# Patient Record
Sex: FEMALE | Race: WHITE | NOT HISPANIC OR LATINO | URBAN - METROPOLITAN AREA
[De-identification: names, ages, dates, MRNs, and addresses within clinical notes are randomized per-mention and may not be internally consistent; named-entity substitution may affect disease eponyms.]

---

## 2019-06-27 ENCOUNTER — TRANSCRIBE ORDERS (OUTPATIENT)
Dept: PHYSICAL THERAPY | Facility: HOSPITAL | Age: 49
End: 2019-06-27

## 2019-06-27 DIAGNOSIS — R26.81 GAIT INSTABILITY: Primary | ICD-10-CM

## 2019-07-02 ENCOUNTER — APPOINTMENT (OUTPATIENT)
Dept: PHYSICAL THERAPY | Facility: HOSPITAL | Age: 49
End: 2019-07-02

## 2019-07-11 ENCOUNTER — HOSPITAL ENCOUNTER (OUTPATIENT)
Dept: PHYSICAL THERAPY | Facility: HOSPITAL | Age: 49
Setting detail: THERAPIES SERIES
Discharge: HOME | End: 2019-07-11

## 2019-07-11 DIAGNOSIS — R26.81 GAIT INSTABILITY: ICD-10-CM

## 2019-07-11 DIAGNOSIS — R26.89 BALANCE PROBLEM: Primary | ICD-10-CM

## 2019-07-11 PROCEDURE — 97162 PT EVAL MOD COMPLEX 30 MIN: CPT

## 2019-07-11 PROCEDURE — 97110 THERAPEUTIC EXERCISES: CPT

## 2019-07-11 NOTE — THERAPY EVALUATION
Outpatient Physical Therapy Ortho Initial Evaluation  [facility]     Patient Name: Akua Alvarado  : 1970  MRN: [id number]  Today's Date: 2019      Visit Date: 2019    There is no problem list on file for this patient.       History reviewed. No pertinent past medical history.     History reviewed. No pertinent surgical history.    Visit Dx:     ICD-10-CM ICD-9-CM   1. Balance problem R26.89 781.99   2. Gait instability R26.81 781.2         Patient History     Row Name 19 1500             History    Chief Complaint  Balance Problems  -LB      Date Current Problem(s) Began  18  -LB      Brief Description of Current Complaint  Pt reports worsening gait deficits over last year. Long hx of falls in multiple areas, unstable DM and unstable BP resulting in multiple episodes of blacking out and falling as recent as 2019. Pt reports currently medications are controlling BP and DM so syncopal episodes are decreasing. Fall 6 months ago resulted in L ankle fx. No other recent injuries reported. RLE pain from knee to ankle diagnosed as neuropathy. Pt lives alone and has 8 steps to basement with handrail. She reports 40-50 fall in last 2 months. Pt works at [email or web address] with cart to hold or seated work. Pt uses SC occasionally for long distance ambulation.   -LB      Previous treatment for THIS PROBLEM  Rehabilitation short rehab stent at [facility]  -LB      Patient/Caregiver Goals  Return to prior level of function;Know what to do to help the symptoms;Improve mobility  -LB      Patient seeing anyone else for problem(s)?  yes  -LB      History of Previous Related Injuries  L ankle fx 2018  -LB         Pain     Pain Location  Leg  -LB      Pain at Present  2  -LB      Pain at Best  0  -LB      Pain at Worst  2  -LB      Pain Frequency  Intermittent  -LB      Pain Description  Aching  -LB      What Performance Factors Make the Current Problem(s) WORSE?  uneven surfaces  -LB       What Performance Factors Make the Current Problem(s) BETTER?  sitting  -LB      Is your sleep disturbed?  No  -LB         Fall Risk Assessment    Any falls in the past year:  Yes  -LB      Number of falls reported in the last 12 months  >50  -LB      Factors that contributed to the fall:  Tripped;Lost balance  -LB      Does patient have a fear of falling  Yes (comment)  -LB         Services    Prior Rehab/Home Health Experiences  Yes  -LB      When was the prior experience with Rehab/Home Health  unsure  -LB      Where was the prior experience with Rehab/Home Health  [facility]  -LB      Are you currently receiving Home Health services  No  -LB      Do you plan to receive Home Health services in the near future  No  -LB         Daily Activities    Primary Language  English  -LB      Pt Participated in POC and Goals  Yes  -LB         Safety    Are you being hurt, hit, or frightened by anyone at home or in your life?  No  -LB      Are you being neglected by a caregiver  No  -LB        User Key  (r) = Recorded By, (t) = Taken By, (c) = Cosigned By    Initials Name Provider Type    Benita Mcintosh, PT Physical Therapist          PT Ortho     Row Name 07/11/19 1600       Subjective Comments    Subjective Comments  I fall alot. I am just unstable all of the time.  -LB       Precautions and Contraindications    Precautions/Limitations  fall precautions  -LB       Subjective Pain    Able to rate subjective pain?  yes  -LB    Pre-Treatment Pain Level  2  -LB       Posture/Observations    Posture/Observations Comments  wide GEE, B hip ER, lateral sway with static stand  -LB       Myotomal Screen- Lower Quarter Clearing    Hip flexion (L2)  Bilateral:;4- (Good -)  -LB    Knee extension (L3)  Bilateral:;5 (Normal)  -LB    Ankle DF (L4)  Bilateral:;4- (Good -)  -LB    Ankle PF (S1)  Bilateral:;3 (Fair)  -LB    Knee flexion (S2)  Bilateral:;4 (Good)  -LB       General ROM    GENERAL ROM COMMENTS  BLE WFL  -LB       Sensation     Light Touch  Partial deficits in the RLE  -LB       Balance Skills Training    Sitting-Level of Assistance  Independent  -LB    Sitting-Balance Support  Feet supported  -LB    Standing-Level of Assistance  Independent  -LB    Static Standing Balance Support  No upper extremity supported  -LB    Standing-Balance Activities  Feet together;Forward lean;Single Limb Stance;Tandem Stance;Semi-tandum  -LB    Gait Balance-Level of Assistance  Close supervision;Contact guard  -LB    Gait Balance Support  No upper extremity supported  -LB    Gait Balance Activities  scanning environment R/L;side-stepping;backwards;tandem  -LB    SLS  unable to perform without BUE assist  -LB    Ankle Strategy Assessment (Balance)  B ankle weakness  -LB    Hip Strategy Assessment (Balance)  B hip abductor weakness  -LB    Stepping Strategy Assessment (Balance)  decreased reaction time  -LB       Gait/Stairs Assessment/Training    Chelsea Level (Gait)  independent  -LB    Deviations/Abnormal Patterns (Gait)  bilateral deviations;base of support, wide  -LB    Bilateral Gait Deviations  lateral trunk flexion;Trendelenburg sign;leans left  -LB    Comment (Gait/Stairs)  posterior weight shift  -LB      User Key  (r) = Recorded By, (t) = Taken By, (c) = Cosigned By    Initials Name Provider Type    Benita Mcintosh, PT Physical Therapist                      Therapy Education  Education Details: issued HEP, discussed using SC for all community ambulation  Given: HEP, Symptoms/condition management, Fall prevention and home safety, Mobility training  Program: New  How Provided: Verbal, Demonstration  Provided to: Patient  Level of Understanding: Teach back education performed, Verbalized, Demonstrated     PT OP Goals     Row Name 07/11/19 1600          PT Short Term Goals    STG Date to Achieve  07/25/19  -LB     STG 1  Pt will demonstrate understanding and compliance with initial HEP.  -LB     STG 1 Progress  New  -LB     STG 2  Pt will  demonstrate HR x 10 B in standing.  -LB     STG 2 Progress  New  -LB     STG 3  Pt will ambulate with SC for all community ambulation to reduce her risk of falls.  -LB     STG 3 Progress  New  -LB        Long Term Goals    LTG Date to Achieve  08/10/19  -LB     LTG 1  Pt will report 0 falls in last 2 weeks.  -LB     LTG 1 Progress  New  -LB     LTG 2  Pt will demonstrate ankle PF/DF strength improved to 4/5 or better to improve her stability.  -LB     LTG 2 Progress  New  -LB     LTG 3  Pt will demonstrate improved Elliott Balance score from 39 to 44 or better to reduce her to low fall risk.  -LB     LTG 3 Progress  New  -LB        Time Calculation    PT Goal Re-Cert Due Date  10/09/19  -LB       User Key  (r) = Recorded By, (t) = Taken By, (c) = Cosigned By    Initials Name Provider Type    Benita Mcintosh, PT Physical Therapist          PT Assessment/Plan     Row Name 07/11/19 6886          PT Assessment    Functional Limitations  Decreased safety during functional activities;Impaired gait;Limitations in community activities;Performance in leisure activities;Performance in work activities;Limitations in functional capacity and performance;Impaired locomotion  -LB     Impairments  Poor body mechanics;Motor function;Gait;Muscle strength;Balance  -LB     Assessment Comments  Pt is 48 y.o. female referred to outpatient physical therapy for evaluation and treatment of  evolving  bilateral strength deficits resulting in gait impairments, balance problems, and frequent falls.  Patient presents with severe ankle weakness, B hip weakness, gait abnormalities, dec static and dynamic balance, and difficulty with dual tasking. PMHx consistent with uncontrolled DM, BP fluctuations, hx of frequent syncopal episodes triggered by cold/hot primarily, L ankle fx due to fall in April. Personal factors affecting her care include syncopal episodes (pt reports no recent episodes). Pt demonstrates signs and symptoms consistent with  "referring diagnosis. Pt scored 39/56 on the HORN and 41% disability on LEFS. Pt is limited in her ability to participate in community events, community ambulation, household tasks. She will benefit from continued skilled PT services to address functional deficits. Thank you for this referral.  -LB     Please refer to paper survey for additional self-reported information  Yes  -LB     Rehab Potential  Good  -LB     Patient/caregiver participated in establishment of treatment plan and goals  Yes  -LB     Patient would benefit from skilled therapy intervention  Yes  -LB        PT Plan    PT Frequency  2x/week  -LB     Predicted Duration of Therapy Intervention (Therapy Eval)  4 weeks   -LB     Planned CPT's?  PT EVAL MOD COMPLELITY: 78287;PT THER PROC EA 15 MIN: 20197;PT MANUAL THERAPY EA 15 MIN: 76580;PT RE-EVAL: 45276;PT THER ACT EA 15 MIN: 68619;PT NEUROMUSC RE-EDUCATION EA 15 MIN: 33698;PT GAIT TRAINING EA 15 MIN: 45867;PT HOT OR COLD PACK TREAT MCARE;PT HOT/COLD PACK WC NONMCARE: 41214  -LB     PT Plan Comments  Assess tolerance to HEP, warm up on Nustep, consider // bars gait activities, forward, retro, lateral, tandem, step taps on 6\", blue foam EO/EC. core/hip strengthening on mat table.  -LB       User Key  (r) = Recorded By, (t) = Taken By, (c) = Cosigned By    Initials Name Provider Type    Benita Mcintosh, PT Physical Therapist            Exercises     Row Name 07/11/19 1600             Subjective Comments    Subjective Comments  I fall alot. I am just unstable all of the time.  -LB         Subjective Pain    Able to rate subjective pain?  yes  -LB      Pre-Treatment Pain Level  2  -LB         Total Minutes    80797 - PT Therapeutic Exercise Minutes  15  -LB         Exercise 1    Exercise Name 1  seated heel/toe raises  -LB      Reps 1  20  -LB         Exercise 2    Exercise Name 2  SLS at counter  -LB      Reps 2  3  -LB      Time 2  15  -LB         Exercise 3    Exercise Name 3  hip adduction isometric  " -LB      Reps 3  10  -LB      Time 3  5  -LB         Exercise 4    Exercise Name 4  hip abduction HL  -LB      Reps 4  10  -LB      Additional Comments  GTB  -LB        User Key  (r) = Recorded By, (t) = Taken By, (c) = Cosigned By    Initials Name Provider Type    Benita Mcintosh PT Physical Therapist                        Outcome Measure Options: Elliott Balance, Lower Extremity Functional Scale (LEFS)  Elliott Balance Scale  Sitting to Standing: able to stand using hands after several tries  Standing Unsupported: able to stand 2 minutes with supervision  Sitting with Back Unsupported but Feet Supported on Floor or on Stool: able to sit safely and securely for 2 minutes  Standing to Sitting: controls descent by using hands  Transfers: able to transfer safely definite need of hands  Standing Unsupported with Eyes Closed: able to stand 10 seconds with supervision  Standing Unsupported with Feet Together: able to place feet together independently and stand 1 minute with supervision  Reaching Forward with Outstretched Arm While Standing: can reach forward confidently 25 cm (10 inches)   Object From the Floor From a Standing Position: able to  object safely and easily  Turning to Look Behind Over Left and Right Shoulders While Standing: turns sideways only but maintains balance  Turn 360 Degrees: able to turn 360 degrees safely in 4 seconds or less  Place Alternate Foot on Step or Stool While Standing Unsupported: able to complete 4 steps without aid with supervision  Standing Unsupported with One Foot in Front: needs help to step but can hold 15 seconds  Standing on One Leg: tries to lift leg unable to hold 3 seconds but remains standing independently  Elliott Total Score: 39  Elliott Comments: medium fall risk  Lower Extremity Functional Index  Any of your usual work, housework or school activities: No difficulty(41% disability)  Your usual hobbies, recreational or sporting activities: No difficulty  Getting  into or out of the bath: Extreme difficulty or unable to perform activity  Walking between rooms: No difficulty  Putting on your shoes or socks: No difficulty  Squatting: Extreme difficulty or unable to perform activity  Lifting an object, like a bag of groceries from the floor: No difficulty  Performing light activities around your home: No difficulty  Performing heavy activities around your home: A little bit of difficulty  Getting into or out of a car: No difficulty  Walking 2 blocks: A little bit of difficulty  Walking a mile: Quite a bit of difficulty  Going up or down 10 stairs (about 1 flight of stairs): Moderate difficulty  Standing for 1 hour: Moderate difficulty  Sitting for 1 hour: No difficulty  Running on even ground: Extreme difficulty or unable to perform activity  Running on uneven ground: Extreme difficulty or unable to perform activity  Making sharp turns while running fast: Extreme difficulty or unable to perform activity  Hopping: Extreme difficulty or unable to perform activity  Rolling over in bed: No difficulty  Total: 47      Time Calculation:     Start Time: 1515  Stop Time: 1600  Time Calculation (min): 45 min  Total Timed Code Minutes- PT: 15 minute(s)     Therapy Charges for Today     Code Description Service Date Service Provider Modifiers Qty    95636702855 HC PT THER PROC EA 15 MIN 7/11/2019 Benita Oconnor, PT GP 1    57333700930 HC PT EVAL MOD COMPLEXITY 2 7/11/2019 Benita Oconnor, PT GP 1          PT G-Codes  Outcome Measure Options: Elliott Balance, Lower Extremity Functional Scale (LEFS)  Elliott Total Score: 39  Total: 47         Benita Oconnor PT  7/11/2019

## 2019-07-22 ENCOUNTER — HOSPITAL ENCOUNTER (OUTPATIENT)
Dept: PHYSICAL THERAPY | Facility: HOSPITAL | Age: 49
Setting detail: THERAPIES SERIES
Discharge: HOME | End: 2019-07-22

## 2019-07-22 DIAGNOSIS — R26.81 GAIT INSTABILITY: ICD-10-CM

## 2019-07-22 DIAGNOSIS — R26.89 BALANCE PROBLEM: Primary | ICD-10-CM

## 2019-07-22 PROCEDURE — 97110 THERAPEUTIC EXERCISES: CPT

## 2019-07-22 PROCEDURE — 97112 NEUROMUSCULAR REEDUCATION: CPT

## 2019-07-22 NOTE — THERAPY TREATMENT NOTE
Outpatient Physical Therapy Ortho Treatment Note  [facility]     Patient Name: Akua Alvarado  : 1970  MRN: [id number]  Today's Date: 2019      Visit Date: 2019    Visit Dx:    ICD-10-CM ICD-9-CM   1. Balance problem R26.89 781.99   2. Gait instability R26.81 781.2       There is no problem list on file for this patient.       No past medical history on file.     No past surgical history on file.                    PT Assessment/Plan     Row Name 19 1000          PT Assessment    Assessment Comments  Ms. Alvarado returns today for first f/u since initial eval, reporting good compliance with HEP, although some difficulty with SLS. Continued today with progression of global strengthening, with focus on core/hips, and progression of balance/gait activities in // bars.  -CA        PT Plan    PT Plan Comments  Cont to progress per pt tolerance, consider AR press, leg press per pt tolerance.  -CA       User Key  (r) = Recorded By, (t) = Taken By, (c) = Cosigned By    Initials Name Provider Type    Angie Castellanos, PT Physical Therapist            Exercises     Row Name 19 1000             Subjective Comments    Subjective Comments  I did all of my exercises, but I had a hard time with the SLS one.  -CA         Subjective Pain    Able to rate subjective pain?  yes  -CA      Pre-Treatment Pain Level  4  -CA         Total Minutes    26971 - PT Therapeutic Exercise Minutes  25  -CA      81427 -  PT Neuromuscular Reeducation Minutes  15  -CA         Exercise 1    Exercise Name 1  Nu step  -CA      Time 1  5 min  -CA         Exercise 2    Exercise Name 2  SLS  -CA      Reps 2  3  -CA      Time 2  15  -CA      Additional Comments  // bars, UE support, brief moments without UE support  -CA         Exercise 3    Exercise Name 3  hip adduction isometric  -CA      Sets 3  2  -CA      Reps 3  10  -CA      Time 3  5  -CA         Exercise 4    Exercise Name 4  hip abduction HL  -CA      Sets 4  2   "-CA      Reps 4  10  -CA      Time 4  GTB  -CA         Exercise 5    Exercise Name 5  PPT  -CA      Cueing 5  Verbal  -CA      Reps 5  10  -CA      Time 5  5s  -CA         Exercise 6    Exercise Name 6  bridge w/ PPT  -CA      Cueing 6  Verbal  -CA      Reps 6  10  -CA      Additional Comments  small/pain free range  -CA         Exercise 7    Exercise Name 7  B LAQ 2#  -CA      Cueing 7  Verbal  -CA      Sets 7  2  -CA      Reps 7  10  -CA      Time 7  3s  -CA         Exercise 8    Exercise Name 8  seated B HS curl  -CA      Cueing 8  Verbal  -CA      Sets 8  2  -CA      Reps 8  10  -CA      Time 8  RTB  -CA         Exercise 9    Exercise Name 9  fwd/retro walk in // bars  -CA      Cueing 9  Verbal  -CA      Reps 9  2 laps  -CA      Additional Comments  cues to avoid looking down at feet  -CA         Exercise 10    Exercise Name 10  tandem walk in // bars  -CA      Cueing 10  Verbal  -CA      Reps 10  3 laps  -CA      Additional Comments  UE support  -CA         Exercise 11    Exercise Name 11  side stepping in // bars  -CA      Cueing 11  Verbal  -CA      Sets 11  3 laps  -CA      Additional Comments  2 finger support ea hand  -CA         Exercise 12    Exercise Name 12  step taps 6\"  -CA      Cueing 12  Verbal  -CA      Reps 12  x20 B  -CA      Additional Comments  one UE support  -CA         Exercise 13    Exercise Name 13  B standing hip abd and ext  -CA      Cueing 13  Verbal  -CA      Sets 13  2 ea.  -CA      Reps 13  10  -CA         Exercise 14    Exercise Name 14  STS  -CA      Cueing 14  Verbal  -CA      Reps 14  5  -CA      Additional Comments  chair with use of UE  -CA         Exercise 15    Exercise Name 15  B HR  -CA      Cueing 15  Verbal  -CA      Sets 15  2  -CA      Reps 15  8  -CA      Additional Comments  at // bars  -CA        User Key  (r) = Recorded By, (t) = Taken By, (c) = Cosigned By    Initials Name Provider Type    Angie Castellanos PT Physical Therapist                                 "          Time Calculation:   Start Time: 1000  Stop Time: 1040  Time Calculation (min): 40 min  Total Timed Code Minutes- PT: 40 minute(s)  Therapy Charges for Today     Code Description Service Date Service Provider Modifiers Qty    35863838704  PT NEUROMUSC RE EDUCATION EA 15 MIN 7/22/2019 Angie Jasso, PT GP 1    [id number]  PT THER PROC EA 15 MIN 7/22/2019 Angie Jasso, PT GP 2                    Angie Jasso, PT  7/22/2019

## 2019-07-25 ENCOUNTER — APPOINTMENT (OUTPATIENT)
Dept: PHYSICAL THERAPY | Facility: HOSPITAL | Age: 49
End: 2019-07-25

## 2019-07-29 ENCOUNTER — HOSPITAL ENCOUNTER (OUTPATIENT)
Dept: PHYSICAL THERAPY | Facility: HOSPITAL | Age: 49
Setting detail: THERAPIES SERIES
Discharge: HOME | End: 2019-07-29

## 2019-07-29 DIAGNOSIS — R26.89 BALANCE PROBLEM: Primary | ICD-10-CM

## 2019-07-29 DIAGNOSIS — R26.81 GAIT INSTABILITY: ICD-10-CM

## 2019-07-29 PROCEDURE — 97112 NEUROMUSCULAR REEDUCATION: CPT

## 2019-07-29 PROCEDURE — 97110 THERAPEUTIC EXERCISES: CPT

## 2019-07-29 NOTE — THERAPY TREATMENT NOTE
Outpatient Physical Therapy Ortho Treatment Note  [facility]     Patient Name: Akua Alvarado  : 1970  MRN: [id number]  Today's Date: 2019      Visit Date: 2019    Visit Dx:    ICD-10-CM ICD-9-CM   1. Balance problem R26.89 781.99   2. Gait instability R26.81 781.2       There is no problem list on file for this patient.       No past medical history on file.     No past surgical history on file.                    PT Assessment/Plan     Row Name 19 1300          PT Assessment    Assessment Comments  Ms. Alvarado returns today ambulating with out SPC, reports she has been going without cane all day. Continued to progress global strengthening and balance activities this date with no adverse reactions.  -CA        PT Plan    PT Plan Comments  Consider addition of AR press  -CA       User Key  (r) = Recorded By, (t) = Taken By, (c) = Cosigned By    Initials Name Provider Type    Angie Castellanos, PT Physical Therapist            Exercises     Row Name 19 1300             Subjective Comments    Subjective Comments  I think my legs are getting stronger   -CA         Subjective Pain    Able to rate subjective pain?  yes  -CA      Pre-Treatment Pain Level  5  -CA         Total Minutes    20318 - PT Therapeutic Exercise Minutes  12  -CA      09759 -  PT Neuromuscular Reeducation Minutes  23  -CA         Exercise 1    Exercise Name 1  Nu step  -CA      Time 1  5 min  -CA         Exercise 2    Exercise Name 2  grapevinee  -CA      Cueing 2  Verbal  -CA      Reps 2  3 laps // Bars  -CA      Additional Comments  UE support  -CA         Exercise 3    Exercise Name 3  hip adduction isometric  -CA      Sets 3  2  -CA      Reps 3  10  -CA      Time 3  5  -CA         Exercise 4    Exercise Name 4  hip abduction HL  -CA      Sets 4  2  -CA      Reps 4  10  -CA      Time 4  GTB  -CA         Exercise 5    Exercise Name 5  lateral step up  -CA      Cueing 5  Verbal  -CA      Sets 5  2 B  -CA       "Reps 5  10  -CA      Time 5  4\" step  -CA      Additional Comments  UE support  -CA         Exercise 6    Exercise Name 6  bridge w/ PPT  -CA      Cueing 6  Verbal  -CA      Sets 6  2  -CA      Reps 6  10  -CA         Exercise 7    Exercise Name 7  B LAQ 3#  -CA      Cueing 7  Verbal  -CA      Sets 7  2  -CA      Reps 7  10  -CA      Time 7  3s  -CA         Exercise 8    Exercise Name 8  seated B HS curl  -CA      Cueing 8  Verbal  -CA      Sets 8  2  -CA      Reps 8  10  -CA      Time 8  RTB  -CA         Exercise 9    Exercise Name 9  Row w/ SLS and opposite toe down  -CA      Cueing 9  Verbal  -CA      Reps 9  15 B  -CA      Time 9  RTB  -CA         Exercise 10    Exercise Name 10  tandem walk in // bars  -CA      Cueing 10  Verbal  -CA      Reps 10  3 laps  -CA      Additional Comments  UE support  -CA         Exercise 11    Exercise Name 11  side stepping in // bars  -CA      Cueing 11  Verbal  -CA      Sets 11  3 laps  -CA      Time 11  RTB  -CA         Exercise 12    Exercise Name 12  step taps 6\"  -CA      Cueing 12  Verbal  -CA      Reps 12  x20 B  -CA      Additional Comments  B 2 finger support  -CA         Exercise 13    Exercise Name 13  B standing hip abd and ext  -CA      Cueing 13  Verbal  -CA      Sets 13  2 ea.  -CA      Reps 13  10  -CA         Exercise 14    Exercise Name 14  STS  -CA      Cueing 14  Verbal  -CA      Sets 14  2  -CA      Reps 14  10  -CA      Additional Comments  from elevated mat  -CA         Exercise 15    Exercise Name 15  B HR and toe raise  -CA      Cueing 15  Verbal  -CA      Sets 15  2  -CA      Reps 15  10  -CA      Additional Comments  // bars  -CA         Exercise 16    Exercise Name 16  Narrow GEE blue fo  -CA      Sets 16  2  -CA      Time 16  20s  -CA         Exercise 17    Exercise Name 17  eyes closed blue foam  -CA      Sets 17  3  -CA      Time 17  10s  -CA         Exercise 18    Exercise Name 18  marching in // bars  -CA      Reps 18  3 laps  -CA      Additional " Comments   no UE  -CA        User Key  (r) = Recorded By, (t) = Taken By, (c) = Cosigned By    Initials Name Provider Type    Angie Castellanos, PT Physical Therapist                                          Time Calculation:   Start Time: 1315  Stop Time: 1355  Time Calculation (min): 40 min  Total Timed Code Minutes- PT: 40 minute(s)  Therapy Charges for Today     Code Description Service Date Service Provider Modifiers Qty    [id number]  PT NEUROMUSC RE EDUCATION EA 15 MIN 7/29/2019 Angie Jasso, PT GP 2    [id number]  PT THER PROC EA 15 MIN 7/29/2019 Angie Jasso, PT GP 1                    Angie Jasso, PT  7/29/2019

## 2019-08-01 ENCOUNTER — HOSPITAL ENCOUNTER (OUTPATIENT)
Dept: PHYSICAL THERAPY | Facility: HOSPITAL | Age: 49
Setting detail: THERAPIES SERIES
Discharge: HOME | End: 2019-08-01

## 2019-08-01 DIAGNOSIS — R26.89 BALANCE PROBLEM: Primary | ICD-10-CM

## 2019-08-01 DIAGNOSIS — R26.81 GAIT INSTABILITY: ICD-10-CM

## 2019-08-01 PROCEDURE — 97112 NEUROMUSCULAR REEDUCATION: CPT

## 2019-08-01 PROCEDURE — 97110 THERAPEUTIC EXERCISES: CPT

## 2019-08-01 NOTE — THERAPY TREATMENT NOTE
Outpatient Physical Therapy Ortho Treatment Note  [facility]     Patient Name: Akua Alvarado  : 1970  MRN: [id number]  Today's Date: 2019      Visit Date: 2019    Visit Dx:    ICD-10-CM ICD-9-CM   1. Balance problem R26.89 781.99   2. Gait instability R26.81 781.2       There is no problem list on file for this patient.       No past medical history on file.     No past surgical history on file.                    PT Assessment/Plan     Row Name 19 1100          PT Assessment    Assessment Comments  Continues to amb without SPC, reports only occassional use for long distances and stair navigation at home as she does not have hand rails. Continues to demo balance deficits during tandem walking and standing on unstable surfaces.   -CA        PT Plan    PT Plan Comments  Consider addition of ball toss balance activities.  -CA       User Key  (r) = Recorded By, (t) = Taken By, (c) = Cosigned By    Initials Name Provider Type    Angie Castellanos, PT Physical Therapist            Exercises     Row Name 19 1100             Subjective Comments    Subjective Comments  My R knee feels stiff today  -CA         Subjective Pain    Able to rate subjective pain?  yes  -CA      Pre-Treatment Pain Level  7  -CA      Post-Treatment Pain Level  0  -CA         Total Minutes    07431 - PT Therapeutic Exercise Minutes  25  -CA      83956 -  PT Neuromuscular Reeducation Minutes  15  -CA         Exercise 1    Exercise Name 1  Nu step  -CA      Time 1  5 min  -CA         Exercise 2    Exercise Name 2  grapevinee  -CA      Cueing 2  Verbal  -CA      Reps 2  3 laps // Bars  -CA      Additional Comments  UE support  -CA         Exercise 3    Exercise Name 3  hip adduction isometric  -CA      Sets 3  2  -CA      Reps 3  10  -CA      Time 3  5  -CA         Exercise 4    Exercise Name 4  hip abduction HL  -CA      Sets 4  3  -CA      Reps 4  10  -CA      Time 4  GTB  -CA         Exercise 5    Exercise Name  "5  lateral step up  -CA      Cueing 5  Verbal  -CA      Sets 5  2 B  -CA      Reps 5  10  -CA      Time 5  4\" step + blue foam  -CA      Additional Comments  UE support  -CA         Exercise 6    Exercise Name 6  bridge w/ PPT  -CA      Cueing 6  Verbal  -CA      Sets 6  2  -CA      Reps 6  10  -CA         Exercise 7    Exercise Name 7  B LAQ 4#  -CA      Cueing 7  Verbal  -CA      Sets 7  2  -CA      Reps 7  10  -CA      Time 7  3s  -CA         Exercise 8    Exercise Name 8  seated B HS curl  -CA      Cueing 8  Verbal  -CA      Sets 8  2  -CA      Reps 8  10  -CA      Time 8  GTB  -CA         Exercise 9    Exercise Name 9  Row w/ SLS and opposite toe down  -CA      Cueing 9  Verbal  -CA      Reps 9  15 B  -CA      Time 9  RTB  -CA         Exercise 10    Exercise Name 10  tandem walk in // bars  -CA      Cueing 10  Verbal  -CA      Reps 10  3 laps  -CA      Additional Comments  occassional UE support  -CA         Exercise 11    Exercise Name 11  side stepping in // bars  -CA      Cueing 11  Verbal  -CA      Sets 11  3 laps  -CA      Time 11  RTB  -CA         Exercise 12    Exercise Name 12  B SL clams  -CA      Cueing 12  Verbal  -CA      Reps 12  2x10  -CA      Time 12  RTB  -CA         Exercise 15    Exercise Name 15  mini squat at // bar  -CA      Cueing 15  Verbal  -CA      Sets 15  2  -CA      Reps 15  10  -CA         Exercise 16    Exercise Name 16  tandem stance blue foam  -CA      Sets 16  3 B  -CA      Time 16  15 s  -CA         Exercise 17    Exercise Name 17  eyes closed blue foam  -CA      Sets 17  3  -CA      Time 17  10s  -CA      Additional Comments  narrow GEE  -CA        User Key  (r) = Recorded By, (t) = Taken By, (c) = Cosigned By    Initials Name Provider Type    Angie Castellanos PT Physical Therapist                                          Time Calculation:   Start Time: 1130  Stop Time: 1210  Time Calculation (min): 40 min  Total Timed Code Minutes- PT: 40 minute(s)  Therapy Charges for " Today     Code Description Service Date Service Provider Modifiers Qty    43877600908 HC PT NEUROMUSC RE EDUCATION EA 15 MIN 8/1/2019 Angie Jasso, PT GP 1    17608366378 HC PT THER PROC EA 15 MIN 8/1/2019 Angie Jasso, PT GP 2                    Angie Jasso, PT  8/1/2019

## 2019-08-05 ENCOUNTER — HOSPITAL ENCOUNTER (OUTPATIENT)
Dept: PHYSICAL THERAPY | Facility: HOSPITAL | Age: 49
Setting detail: THERAPIES SERIES
Discharge: HOME | End: 2019-08-05

## 2019-08-05 DIAGNOSIS — R26.89 BALANCE PROBLEM: Primary | ICD-10-CM

## 2019-08-05 DIAGNOSIS — R26.81 GAIT INSTABILITY: ICD-10-CM

## 2019-08-05 PROCEDURE — 97110 THERAPEUTIC EXERCISES: CPT

## 2019-08-05 PROCEDURE — 97112 NEUROMUSCULAR REEDUCATION: CPT

## 2019-08-05 NOTE — THERAPY TREATMENT NOTE
Outpatient Physical Therapy Ortho Treatment Note  [facility]     Patient Name: Akua Alvarado  : 1970  MRN: [id number]  Today's Date: 2019      Visit Date: 2019    Visit Dx:    ICD-10-CM ICD-9-CM   1. Balance problem R26.89 781.99   2. Gait instability R26.81 781.2       There is no problem list on file for this patient.       No past medical history on file.     No past surgical history on file.                    PT Assessment/Plan     Row Name 19 1000          PT Assessment    Assessment Comments  Continued with progression of balance activities today, including addition of ball toss activity. Pt requires CGA throughout ball toss, with CGa to min A once on blue foam. Will cont to progress B LE strength and balance  -CA        PT Plan    PT Plan Comments  Consider addition of walk with ball rotation  -CA       User Key  (r) = Recorded By, (t) = Taken By, (c) = Cosigned By    Initials Name Provider Type    Angie Castellanos, PT Physical Therapist            Exercises     Row Name 19 1000             Subjective Comments    Subjective Comments  I'm feeling a lot better, no knee pain today  -CA         Subjective Pain    Able to rate subjective pain?  yes  -CA      Pre-Treatment Pain Level  0  -CA         Total Minutes    74508 - PT Therapeutic Exercise Minutes  15  -CA      19565 -  PT Neuromuscular Reeducation Minutes  25  -CA         Exercise 1    Exercise Name 1  Nu step  -CA      Time 1  5 min  -CA         Exercise 2    Exercise Name 2  grapevinee  -CA      Cueing 2  Verbal  -CA      Reps 2  3 laps // Bars  -CA      Additional Comments  UE support  -CA         Exercise 3    Exercise Name 3  AR press  -CA      Reps 3  15 B  -CA      Time 3  YTB  -CA         Exercise 4    Exercise Name 4  B SL clam  -CA      Sets 4  2  -CA      Reps 4  10  -CA      Time 4  YTB  -CA         Exercise 5    Exercise Name 5  lateral step up  -CA      Cueing 5  Verbal  -CA      Sets 5  2 B  -CA       "Reps 5  10  -CA      Time 5  4\" step + blue foam  -CA      Additional Comments  UE support  -CA         Exercise 7    Exercise Name 7  B LAQ 5#  -CA      Cueing 7  Verbal  -CA      Sets 7  2  -CA      Reps 7  10  -CA      Time 7  3s  -CA         Exercise 8    Exercise Name 8  B HS curl standing  -CA      Cueing 8  Verbal  -CA      Sets 8  2  -CA      Reps 8  10  -CA         Exercise 9    Exercise Name 9  Row w/ SLS and opposite toe down  -CA      Cueing 9  Verbal  -CA      Reps 9  15 B  -CA      Time 9  RTB  -CA         Exercise 10    Exercise Name 10  tandem walk in // bars  -CA      Cueing 10  Verbal  -CA      Reps 10  3 laps  -CA      Additional Comments  occassional UE support  -CA         Exercise 11    Exercise Name 11  side stepping in // bars  -CA      Cueing 11  Verbal  -CA      Sets 11  3 laps  -CA      Time 11  RTB  -CA         Exercise 12    Exercise Name 12  split stance on yellow foam with #2 ball wood choppers  -CA      Cueing 12  Verbal  -CA      Reps 12  15B  -CA         Exercise 13    Exercise Name 13  ball toss activities in // bars with CGA and occassional min A from therapist  -CA      Time 13  8 min  -CA      Additional Comments  toss at catch with narrow GEE; B semi tandem; B one foot on blue foam, one on ground; and both feet on blue foam; dual tasking for 1 foot on 1 foot of includes telling pt whether to bounce or throw ball after catch.  -CA         Exercise 15    Exercise Name 15  mini squat at // bar  -CA      Cueing 15  Verbal  -CA      Sets 15  2  -CA      Reps 15  10  -CA        User Key  (r) = Recorded By, (t) = Taken By, (c) = Cosigned By    Initials Name Provider Type    Angie Castellanos PT Physical Therapist                                          Time Calculation:   Start Time: 1000  Stop Time: 1040  Time Calculation (min): 40 min  Total Timed Code Minutes- PT: 40 minute(s)  Therapy Charges for Today     Code Description Service Date Service Provider Modifiers Qty    " [id number]  PT NEUROMUSC RE EDUCATION EA 15 MIN 8/5/2019 Angie Jasso, PT GP 2    [id number]  PT THER PROC EA 15 MIN 8/5/2019 Angie Jasso, PT GP 1                    Angie Jasso, PT  8/5/2019

## 2019-08-07 ENCOUNTER — HOSPITAL ENCOUNTER (OUTPATIENT)
Dept: PHYSICAL THERAPY | Facility: HOSPITAL | Age: 49
Setting detail: THERAPIES SERIES
Discharge: HOME | End: 2019-08-07

## 2019-08-07 DIAGNOSIS — R26.81 GAIT INSTABILITY: ICD-10-CM

## 2019-08-07 DIAGNOSIS — R26.89 BALANCE PROBLEM: Primary | ICD-10-CM

## 2019-08-07 PROCEDURE — 97110 THERAPEUTIC EXERCISES: CPT

## 2019-08-07 PROCEDURE — 97112 NEUROMUSCULAR REEDUCATION: CPT

## 2019-08-07 NOTE — THERAPY TREATMENT NOTE
Outpatient Physical Therapy Ortho Treatment Note  [facility]     Patient Name: Akua Alvarado  : 1970  MRN: [id number]  Today's Date: 2019      Visit Date: 2019    Visit Dx:    ICD-10-CM ICD-9-CM   1. Balance problem R26.89 781.99   2. Gait instability R26.81 781.2       There is no problem list on file for this patient.       No past medical history on file.     No past surgical history on file.                    PT Assessment/Plan     Row Name 19 1500          PT Assessment    Assessment Comments  Pt cont to show gradual improvement in balance, but overall still limited and at risk of falls. Continued with progression of dynamic balance activities including on compliant surfaces.  -CA        PT Plan    PT Plan Comments  Cont to progress balance and gait training  -CA       User Key  (r) = Recorded By, (t) = Taken By, (c) = Cosigned By    Initials Name Provider Type    Angie Castellanos, PT Physical Therapist            Exercises     Row Name 19 1400             Subjective Comments    Subjective Comments  My walking is getting better  -CA         Subjective Pain    Able to rate subjective pain?  yes  -CA      Pre-Treatment Pain Level  0  -CA         Total Minutes    14356 - PT Therapeutic Exercise Minutes  15  -CA      55142 -  PT Neuromuscular Reeducation Minutes  25  -CA         Exercise 1    Exercise Name 1  Nu step  -CA      Sets 1  lvl 5  -CA      Time 1  5 min  -CA         Exercise 2    Exercise Name 2  walking in gym with head rotation and calling out objects on each side no UE  -CA      Cueing 2  Verbal  -CA      Reps 2  50 ft  -CA         Exercise 3    Exercise Name 3  AR press  -CA      Reps 3  10 B  -CA      Time 3  YTB  -CA      Additional Comments  stagger stance  -CA         Exercise 4    Exercise Name 4  B SL clam  -CA      Sets 4  2  -CA      Reps 4  10  -CA      Time 4  RTB  -CA         Exercise 5    Exercise Name 5  lateral step up with opposite leg march   "-CA      Cueing 5  Verbal  -CA      Sets 5  2 B  -CA      Reps 5  10  -CA      Time 5  4\" step + blue foam  -CA      Additional Comments  UE support  -CA         Exercise 6    Exercise Name 6  fwd step up with march  -CA      Cueing 6  Verbal  -CA      Sets 6  2 B  -CA      Reps 6  10  -CA      Time 6  4\" + blue foam  -CA      Additional Comments  UE support  -CA         Exercise 7    Exercise Name 7  STS from BOSU on mat with stagger stance  -CA      Cueing 7  Verbal  -CA      Sets 7  2 B  -CA      Reps 7  5  -CA         Exercise 9    Exercise Name 9  obstacle course in // bars  -CA      Cueing 9  Verbal  -CA      Reps 9  4 laps  -CA      Additional Comments  step up and over BOSU; tandem walk across blue and yellow foam; step over rudy; backwards walk to end of // bars; 10 mini squat  -CA         Exercise 10    Exercise Name 10  tandem stance on blue foam  -CA      Cueing 10  Verbal  -CA      Sets 10  3 B  -CA      Time 10  15s  -CA         Exercise 11    Exercise Name 11  walking in // bars with trunk and head rot in opposite direction with #1 ball  -CA      Cueing 11  Verbal  -CA      Sets 11  4 laps  -CA        User Key  (r) = Recorded By, (t) = Taken By, (c) = Cosigned By    Initials Name Provider Type    Angie Castellanos, RICKEY Physical Therapist                                          Time Calculation:   Start Time: 1400  Stop Time: 1440  Time Calculation (min): 40 min  Total Timed Code Minutes- PT: 40 minute(s)  Therapy Charges for Today     Code Description Service Date Service Provider Modifiers Qty    60800034860 HC PT NEUROMUSC RE EDUCATION EA 15 MIN 8/7/2019 Angie Jasso, PT GP 2    75949117219 HC PT THER PROC EA 15 MIN 8/7/2019 Angie Jasso, PT GP 1                    Angie Jasso PT  8/7/2019     "

## 2019-08-08 ENCOUNTER — APPOINTMENT (OUTPATIENT)
Dept: PHYSICAL THERAPY | Facility: HOSPITAL | Age: 49
End: 2019-08-08

## 2019-08-12 ENCOUNTER — APPOINTMENT (OUTPATIENT)
Dept: PHYSICAL THERAPY | Facility: HOSPITAL | Age: 49
End: 2019-08-12

## 2019-08-14 ENCOUNTER — HOSPITAL ENCOUNTER (OUTPATIENT)
Dept: PHYSICAL THERAPY | Facility: HOSPITAL | Age: 49
Setting detail: THERAPIES SERIES
Discharge: HOME | End: 2019-08-14

## 2019-08-14 DIAGNOSIS — R26.81 GAIT INSTABILITY: ICD-10-CM

## 2019-08-14 DIAGNOSIS — R26.89 BALANCE PROBLEM: Primary | ICD-10-CM

## 2019-08-14 PROCEDURE — 97110 THERAPEUTIC EXERCISES: CPT

## 2019-08-14 PROCEDURE — 97112 NEUROMUSCULAR REEDUCATION: CPT

## 2019-08-14 NOTE — THERAPY TREATMENT NOTE
"    Outpatient Physical Therapy Ortho Treatment Note  [facility]     Patient Name: Akua Alvarado  : 1970  MRN: [id number]  Today's Date: 2019      Visit Date: 2019    Visit Dx:    ICD-10-CM ICD-9-CM   1. Balance problem R26.89 781.99   2. Gait instability R26.81 781.2       There is no problem list on file for this patient.       No past medical history on file.     No past surgical history on file.                    PT Assessment/Plan     Row Name 19 1400          PT Assessment    Assessment Comments  Pt cont to demo improvement in balance, reports feeling less \"wobbly\" overall. Continued with high level balance and multitasking activities today, with some generalized LE strengthening.  -CA        PT Plan    PT Plan Comments  Cont to progress balance and gait training, consider obstacle course with different height objects for stepping over.  -CA       User Key  (r) = Recorded By, (t) = Taken By, (c) = Cosigned By    Initials Name Provider Type    Angie Castellanos, PT Physical Therapist            Exercises     Row Name 19 1400             Subjective Comments    Subjective Comments  still feel a little wobbly, Monday my ankle rolled over but I didn't fall. I do think my balance has gotten better  -CA         Subjective Pain    Able to rate subjective pain?  yes  -CA      Pre-Treatment Pain Level  0  -CA         Total Minutes    96861 - PT Therapeutic Exercise Minutes  15  -CA      08249 -  PT Neuromuscular Reeducation Minutes  25  -CA         Exercise 1    Exercise Name 1  recumbent bike  -CA      Sets 1  lvl 3  -CA      Time 1  5 min  -CA         Exercise 2    Exercise Name 2  mini squat in // bars  -CA      Cueing 2  Verbal  -CA      Sets 2  2  -CA      Reps 2  10  -CA         Exercise 3    Exercise Name 3  AR press  -CA      Reps 3  10 B  -CA      Time 3  YTB  -CA         Exercise 4    Exercise Name 4  leg press  -CA      Sets 4  2  -CA      Reps 4  10  -CA      Time 4  80#  " "-CA         Exercise 6    Exercise Name 6  fwd step up with march  -CA      Cueing 6  Verbal  -CA      Sets 6  2 B  -CA      Reps 6  10  -CA      Time 6  6\" + blue foam  -CA      Additional Comments  UE support  -CA         Exercise 7    Exercise Name 7  STS from BOSU on mat with stagger stance  -CA      Cueing 7  Verbal  -CA      Sets 7  2 B  -CA      Reps 7  10  -CA         Exercise 8    Exercise Name 8  tband shldr ext  -CA      Cueing 8  Verbal  -CA      Sets 8  2  -CA      Reps 8  10  -CA      Time 8  rtb  -CA      Additional Comments  stagger stance  -CA         Exercise 10    Exercise Name 10  tandem stance on blue foam  -CA      Cueing 10  Verbal  -CA      Sets 10  3 B  -CA      Time 10  15s  -CA         Exercise 11    Exercise Name 11  side stepping in // bars  -CA      Cueing 11  Verbal  -CA      Sets 11  3 laps  -CA      Time 11  RTB  -CA         Exercise 12    Exercise Name 12  step taps 6\"  -CA      Cueing 12  Verbal  -CA      Reps 12  x20 B  -CA      Additional Comments  B 2 finger support  -CA         Exercise 13    Exercise Name 13  ball toss activities  -CA      Reps 13  CGA  -CA      Time 13  10 min  -CA      Additional Comments  (1) facing 90 deg away from ball , rotate at trunk to face ball  and catch ball, rotate 180 deg to tap ball on PT's hand as target, rotate back to ball  and then toss and catch ball; (2) one foot on BOSU one on ground toss and catch  -CA        User Key  (r) = Recorded By, (t) = Taken By, (c) = Cosigned By    Initials Name Provider Type    CA Angie Jasso PT Physical Therapist                                          Time Calculation:   Start Time: 1405  Stop Time: 1445  Time Calculation (min): 40 min  Total Timed Code Minutes- PT: 40 minute(s)  Therapy Charges for Today     Code Description Service Date Service Provider Modifiers Qty    64448025607 HC PT NEUROMUSC RE EDUCATION EA 15 MIN 8/14/2019 Angie Jasso, PT GP 2    26557208054 HC PT THER " PROC EA 15 MIN 8/14/2019 Angie Jasso, PT GP 1                    Angie Jasso, PT  8/14/2019

## 2019-08-15 ENCOUNTER — APPOINTMENT (OUTPATIENT)
Dept: PHYSICAL THERAPY | Facility: HOSPITAL | Age: 49
End: 2019-08-15

## 2019-08-23 ENCOUNTER — HOSPITAL ENCOUNTER (OUTPATIENT)
Dept: PHYSICAL THERAPY | Facility: HOSPITAL | Age: 49
Setting detail: THERAPIES SERIES
Discharge: HOME | End: 2019-08-23

## 2019-08-23 DIAGNOSIS — R26.89 BALANCE PROBLEM: Primary | ICD-10-CM

## 2019-08-23 DIAGNOSIS — R26.81 GAIT INSTABILITY: ICD-10-CM

## 2019-08-23 PROCEDURE — 97110 THERAPEUTIC EXERCISES: CPT

## 2019-08-23 PROCEDURE — 97112 NEUROMUSCULAR REEDUCATION: CPT

## 2019-08-23 NOTE — THERAPY TREATMENT NOTE
Outpatient Physical Therapy Ortho Treatment Note  [facility]     Patient Name: Akua Alvarado  : 1970  MRN: [id number]  Today's Date: 2019      Visit Date: 2019    Visit Dx:    ICD-10-CM ICD-9-CM   1. Balance problem R26.89 781.99   2. Gait instability R26.81 781.2       There is no problem list on file for this patient.       No past medical history on file.     No past surgical history on file.                    PT Assessment/Plan     Row Name 19 1500          PT Assessment    Assessment Comments  Pt cont to demonstrate good progress with self reported improved function. Continued today with global strengthening and multi-tasking balance activities.   -CA        PT Plan    PT Plan Comments  Cont to progress balance and gait training, consider obstacle course with different height objects for stepping over.  -CA       User Key  (r) = Recorded By, (t) = Taken By, (c) = Cosigned By    Initials Name Provider Type    CA Angie Jasso, PT Physical Therapist            Exercises     Row Name 19 1400             Subjective Comments    Subjective Comments  [place] without my cane and wasn't wobbly at all  -CA         Subjective Pain    Able to rate subjective pain?  yes  -CA      Pre-Treatment Pain Level  0  -CA         Total Minutes    86010 - PT Therapeutic Exercise Minutes  25  -CA      82951 -  PT Neuromuscular Reeducation Minutes  15  -CA         Exercise 1    Exercise Name 1  Nu step  -CA      Sets 1  lvl 5  -CA      Time 1  5 min  -CA         Exercise 2    Exercise Name 2  walking around clinic while bouncing and catching swiss ball  -CA      Cueing 2  Verbal  -CA      Reps 2  2 laps around clinic  -CA      Additional Comments  therapist providing CGA with gait belt  -CA         Exercise 3    Exercise Name 3  AR press  -CA      Reps 3  20 B  -CA      Time 3  RTB  -CA         Exercise 4    Exercise Name 4  leg press  -CA      Sets 4  3   "-CA      Reps 4  10  -CA      Time 4  80#  -CA         Exercise 5    Exercise Name 5  step taps  -CA      Cueing 5  Verbal  -CA      Reps 5  x20 B  -CA      Time 5  standing on blue foam  -CA      Additional Comments  6\" step tap  -CA         Exercise 6    Exercise Name 6  mini squat against swiss ball on wall  -CA      Cueing 6  Verbal  -CA      Sets 6  3  -CA      Reps 6  10  -CA         Exercise 8    Exercise Name 8  tband shldr ext  -CA      Cueing 8  Verbal  -CA      Sets 8  2  -CA      Reps 8  10  -CA      Time 8  rtb  -CA      Additional Comments  stagger stance; alt UE  -CA         Exercise 9    Exercise Name 9  step up and through BOSU  -CA      Cueing 9  Verbal  -CA      Reps 9  20 B  -CA         Exercise 10    Exercise Name 10  tandem stance on blue foam  -CA      Cueing 10  Verbal  -CA      Sets 10  3 B  -CA      Time 10  20s  -CA         Exercise 11    Exercise Name 11  side stepping in // bars  -CA      Cueing 11  Verbal  -CA      Sets 11  3 laps  -CA      Time 11  RTB  -CA      Additional Comments  with #2 med ball press  -CA         Exercise 12    Exercise Name 12  trunk rotation while standing on blue foam  -CA      Cueing 12  Verbal  -CA      Reps 12  x20 B  -CA      Time 12  #1 ball  -CA         Exercise 14    Exercise Name 14  tandem walk  -CA      Cueing 14  Verbal  -CA      Reps 14  4 laps in // bars  -CA      Time 14  fwd and bkwd  -CA         Exercise 15    Exercise Name 15  high marches  -CA      Cueing 15  Verbal  -CA      Reps 15  3 laps in // bars  -CA      Time 15  no UE support  -CA        User Key  (r) = Recorded By, (t) = Taken By, (c) = Cosigned By    Initials Name Provider Type    Angie Castellanos PT Physical Therapist                                          Time Calculation:   Start Time: 1445  Stop Time: 1525  Time Calculation (min): 40 min  Total Timed Code Minutes- PT: 40 minute(s)  Therapy Charges for Today     Code Description Service Date Service Provider Modifiers Qty "    [id number]  PT NEUROMUSC RE EDUCATION EA 15 MIN 8/23/2019 Angie Jasso, PT GP 1    93163725605 HC PT THER PROC EA 15 MIN 8/23/2019 Angie Jasso, PT GP 2                    Angie Jasso, PT  8/23/2019

## 2019-08-26 ENCOUNTER — HOSPITAL ENCOUNTER (OUTPATIENT)
Dept: PHYSICAL THERAPY | Facility: HOSPITAL | Age: 49
Setting detail: THERAPIES SERIES
Discharge: HOME | End: 2019-08-26

## 2019-08-26 DIAGNOSIS — R26.81 GAIT INSTABILITY: ICD-10-CM

## 2019-08-26 DIAGNOSIS — R26.89 BALANCE PROBLEM: Primary | ICD-10-CM

## 2019-08-26 PROCEDURE — 97112 NEUROMUSCULAR REEDUCATION: CPT

## 2019-08-26 PROCEDURE — 97110 THERAPEUTIC EXERCISES: CPT

## 2019-08-26 NOTE — THERAPY TREATMENT NOTE
Outpatient Physical Therapy Ortho Treatment Note  [facility]     Patient Name: Akua Alvarado  : 1970  MRN: [id number]  Today's Date: 2019      Visit Date: 2019    Visit Dx:    ICD-10-CM ICD-9-CM   1. Balance problem R26.89 781.99   2. Gait instability R26.81 781.2       There is no problem list on file for this patient.       No past medical history on file.     No past surgical history on file.                    PT Assessment/Plan     Row Name 19 1400          PT Assessment    Assessment Comments  Ms. Alvarado has shown good progress with overall balance and gait mechanics. She reports improved communnity ambulation as she no longer relied on a SPC.  Continued today with high level balance activities on a compliant surface.  -CA        PT Plan    PT Plan Comments  Cont with high level/unpredictable balance activities involving multi-tasking, and multiple compliant surfaces.  -CA       User Key  (r) = Recorded By, (t) = Taken By, (c) = Cosigned By    Initials Name Provider Type    Angie Castellanos, PT Physical Therapist            Exercises     Row Name 19 1400             Subjective Comments    Subjective Comments  I am doing so much better  -CA         Subjective Pain    Able to rate subjective pain?  yes  -CA      Pre-Treatment Pain Level  0  -CA         Total Minutes    35374 - PT Therapeutic Exercise Minutes  15  -CA      78722 -  PT Neuromuscular Reeducation Minutes  25  -CA         Exercise 1    Exercise Name 1  Nu step  -CA      Sets 1  lvl 5  -CA      Time 1  5 min  -CA         Exercise 2    Exercise Name 2  walking around clinic while bouncing and catching swiss ball  -CA      Cueing 2  Verbal  -CA      Reps 2  3 laps around clinic  -CA      Time 2  1st: bouncing ball back and forth, straight and side to side; 2nd: Tech alternates between bouning and tossing ball and pt returns ball the way delivered; 3rd: tech switches between bouncing and tossing at random, PT  "calling out bounce or throw to pt, who has to return ball in this manner  -CA      Additional Comments  therapist providing CGA with gait belt  -CA         Exercise 3    Exercise Name 3  AR press  -CA      Sets 3  blue foam  -CA      Reps 3  20 B  -CA      Time 3  RTB  -CA      Additional Comments  CGA  -CA         Exercise 5    Exercise Name 5  obstacle course  -CA      Cueing 5  Verbal  -CA      Reps 5  2 laps  -CA      Time 5  CGA to min A throughout, ocassional HHA  -CA      Additional Comments  walk across 2 blue foam squares, ylw disc, and green disc with gap btwn each disc, step over medium height rudy, dumbbell, then full height rudy, tandem walk across red rectangle board, stand on blue foam and throw #1 med ball against rebounder and catch 5x.  -CA         Exercise 7    Exercise Name 7  STS from BOSU on mat with stagger stance  -CA      Cueing 7  Verbal  -CA      Sets 7  2 B  -CA      Reps 7  10  -CA         Exercise 8    Exercise Name 8  tband shldr ext  -CA      Cueing 8  Verbal  -CA      Sets 8  2  -CA      Reps 8  10  -CA      Time 8  rtb  -CA      Additional Comments  blue foam; CGA  -CA         Exercise 9    Exercise Name 9  step up   -CA      Cueing 9  Verbal  -CA      Reps 9  15 B  -CA      Time 9  8\" step + blue foam  -CA        User Key  (r) = Recorded By, (t) = Taken By, (c) = Cosigned By    Initials Name Provider Type    CA Angie Jasso, PT Physical Therapist                                          Time Calculation:   Start Time: 1430  Stop Time: 1510  Time Calculation (min): 40 min  Total Timed Code Minutes- PT: 40 minute(s)  Therapy Charges for Today     Code Description Service Date Service Provider Modifiers Qty    77488766464  PT NEUROMUSC RE EDUCATION EA 15 MIN 8/26/2019 Angie Jasso, PT GP 2    [id number] HC PT THER PROC EA 15 MIN 8/26/2019 Angie Jasso, PT GP 1                    Angie Jasso PT  8/26/2019     "

## 2019-08-30 ENCOUNTER — HOSPITAL ENCOUNTER (OUTPATIENT)
Dept: PHYSICAL THERAPY | Facility: HOSPITAL | Age: 49
Setting detail: THERAPIES SERIES
Discharge: HOME | End: 2019-08-30

## 2019-08-30 DIAGNOSIS — R26.81 GAIT INSTABILITY: ICD-10-CM

## 2019-08-30 DIAGNOSIS — R26.89 BALANCE PROBLEM: Primary | ICD-10-CM

## 2019-08-30 PROCEDURE — 97112 NEUROMUSCULAR REEDUCATION: CPT

## 2019-08-30 PROCEDURE — 97110 THERAPEUTIC EXERCISES: CPT

## 2019-08-30 NOTE — THERAPY PROGRESS REPORT/RE-CERT
Outpatient Physical Therapy Ortho Progress Note  [facility]     Patient Name: Akua Alvarado  : 1970  MRN: [id number]  Today's Date: 2019      Visit Date: 2019    Visit Dx:    ICD-10-CM ICD-9-CM   1. Balance problem R26.89 781.99   2. Gait instability R26.81 781.2       There is no problem list on file for this patient.       No past medical history on file.     No past surgical history on file.                    PT Assessment/Plan     Row Name 19 1500          PT Assessment    Assessment Comments  Akua Alvarado has been seen for 9 skilled physical therapy sessions following initial eval for gait instability and poor balance.  Treatment has included therapeutic exercise, therapeutic activity, neuro-muscular retraining , gait training and patient education with home exercise program . Progress to physical therapy goals is good.  She will benefit from continued skilled physical therapy to address remaining impairments and functional limitations, with plan to continue for 1-2 more visits to solidify advanced HEP, and safety with ADLs walking.   -CA        PT Plan    Predicted Duration of Therapy Intervention (Therapy Eval)  1-2 more visits  -CA     PT Plan Comments  Cont with high level/unpredictable balance activities involving multi-tasking, and multiple compliant surfaces.  -CA       User Key  (r) = Recorded By, (t) = Taken By, (c) = Cosigned By    Initials Name Provider Type    Angie Castellanos, PT Physical Therapist            Exercises     Row Name 19 1400             Subjective Comments    Subjective Comments  Pt arrives at 2:50 pm for 2:45 pm appointment due to  being late. No complaints.  -CA         Subjective Pain    Able to rate subjective pain?  yes  -CA      Pre-Treatment Pain Level  0  -CA         Total Minutes    38861 - PT Therapeutic Exercise Minutes  10  -CA      35645 -  PT Neuromuscular Reeducation Minutes  30  -CA         Exercise 1    Exercise  "Name 1  Nu step  -CA      Sets 1  lvl 5  -CA      Time 1  5 min  -CA         Exercise 2    Exercise Name 2  HORN balance test  -CA      Time 2  10 min  -CA         Exercise 3    Exercise Name 3  AR press  -CA      Sets 3  blue foam  -CA      Reps 3  20 B  -CA      Time 3  RTB  -CA      Additional Comments  CGA to min A w/ gait belt  -CA         Exercise 4    Exercise Name 4  SLS in // bars  -CA      Cueing 4  Verbal  -CA      Reps 4  3 B  -CA      Time 4  15s  -CA      Additional Comments  occassional UE support  -CA         Exercise 6    Exercise Name 6  walking in // bars tossingg #1 med ball to self  -CA      Cueing 6  Verbal;Demo  -CA      Reps 6  4 laps  -CA         Exercise 8    Exercise Name 8  tband shldr ext  -CA      Cueing 8  Verbal  -CA      Sets 8  2  -CA      Reps 8  10  -CA      Time 8  rtb  -CA      Additional Comments  blue foam; CGA  -CA         Exercise 9    Exercise Name 9  fwd step up   -CA      Cueing 9  Verbal  -CA      Reps 9  20 B  -CA      Time 9  8\" step + blue foam  -CA         Exercise 10    Exercise Name 10  wobble board  -CA      Cueing 10  Verbal  -CA      Sets 10  1 ea  -CA      Reps 10  med/lat and ant/post  -CA      Time 10  one UE support med/lat; no UE support ant/post  -CA      Additional Comments  CGA with gait belt  -CA         Exercise 11    Exercise Name 11  side stepping in // bars  -CA      Cueing 11  Verbal  -CA      Sets 11  3 laps  -CA      Time 11  RTB  -CA      Additional Comments  with #2 med ball press  -CA         Exercise 12    Exercise Name 12  trunk rotation while standing on blue foam  -CA      Cueing 12  Verbal  -CA      Reps 12  x20 B  -CA      Time 12  #1 ball  -CA      Additional Comments  CGA  -CA         Exercise 14    Exercise Name 14  tandem walk  -CA      Cueing 14  Verbal  -CA      Reps 14  3 laps in // bars  -CA      Time 14  fwd and bkwd  -CA         Exercise 15    Exercise Name 15  high marches  -CA      Cueing 15  Verbal  -CA      Reps 15  3 laps " in // bars  -CA      Time 15  no UE support  -CA        User Key  (r) = Recorded By, (t) = Taken By, (c) = Cosigned By    Initials Name Provider Type    Angie Castellanos, PT Physical Therapist                       PT OP Goals     Row Name 08/30/19 1500          PT Short Term Goals    STG Date to Achieve  07/25/19  -CA     STG 1  Pt will demonstrate understanding and compliance with initial HEP.  -CA     STG 1 Progress  Met;Ongoing  -CA     STG 2  Pt will demonstrate HR x 10 B in standing.  -CA     STG 2 Progress  Ongoing  -CA     STG 2 Progress Comments  10 HR, small range, B UE support  -CA     STG 3  Pt will ambulate with SC for all community ambulation to reduce her risk of falls.  -CA     STG 3 Progress  Ongoing  -CA     STG 3 Progress Comments  ambulates with no AD  -CA        Long Term Goals    LTG Date to Achieve  08/10/19  -CA     LTG 1  Pt will report 0 falls in last 2 weeks.  -CA     LTG 1 Progress  Ongoing  -CA     LTG 1 Progress Comments  no falls in past 2 weeks  -CA     LTG 2  Pt will demonstrate ankle PF/DF strength improved to 4/5 or better to improve her stability.  -CA     LTG 2 Progress  Ongoing  -CA     LTG 3  Pt will demonstrate improved Elliott Balance score from 39 to 44 or better to reduce her to low fall risk.  -CA     LTG 3 Progress  Met  -CA     LTG 3 Progress Comments  47  -CA       User Key  (r) = Recorded By, (t) = Taken By, (c) = Cosigned By    Initials Name Provider Type    Angie Castellanos, PT Physical Therapist               Outcome Measure Options: Elliott Balance  Elliott Balance Scale  Sitting to Standing: able to stand independently using hands  Standing Unsupported: able to stand safely for 2 minutes  Sitting with Back Unsupported but Feet Supported on Floor or on Stool: able to sit safely and securely for 2 minutes  Standing to Sitting: sits safely with minimal use of hands  Transfers: able to transfer safely with minor use of hands  Standing Unsupported with Eyes Closed: able  to stand 10 seconds with supervision  Standing Unsupported with Feet Together: able to place feet together independently and stand 1 minute safely  Reaching Forward with Outstretched Arm While Standing: can reach forward confidently 25 cm (10 inches)   Object From the Floor From a Standing Position: able to  object safely and easily  Turning to Look Behind Over Left and Right Shoulders While Standing: looks behind one side only other side shows less weight shift  Turn 360 Degrees: able to turn 360 degrees safely in 4 seconds or less  Place Alternate Foot on Step or Stool While Standing Unsupported: able to stand independently and complete 8 steps in > 20 seconds  Standing Unsupported with One Foot in Front: able to take small step independently and hold 30 seconds  Standing on One Leg: tries to lift leg unable to hold 3 seconds but remains standing independently  Elliott Total Score: 47  Elliott Comments: low fall risk         Time Calculation:   Start Time: 1450  Stop Time: 1530  Time Calculation (min): 40 min  Total Timed Code Minutes- PT: 40 minute(s)  Therapy Charges for Today     Code Description Service Date Service Provider Modifiers Qty    78624494705 HC PT NEUROMUSC RE EDUCATION EA 15 MIN 8/30/2019 Angie Jasso, PT GP 2    12493632972 HC PT THER PROC EA 15 MIN 8/30/2019 Angie Jasso, PT GP 1          PT G-Codes  Outcome Measure Options: Elliott Balance  Elliott Total Score: 47         Angie Jasso, RICKEY  8/30/2019

## 2019-09-03 ENCOUNTER — HOSPITAL ENCOUNTER (OUTPATIENT)
Dept: PHYSICAL THERAPY | Facility: HOSPITAL | Age: 49
Setting detail: THERAPIES SERIES
Discharge: HOME | End: 2019-09-03

## 2019-09-03 DIAGNOSIS — R26.81 GAIT INSTABILITY: ICD-10-CM

## 2019-09-03 DIAGNOSIS — R26.89 BALANCE PROBLEM: Primary | ICD-10-CM

## 2019-09-03 PROCEDURE — 97112 NEUROMUSCULAR REEDUCATION: CPT

## 2019-09-03 PROCEDURE — 97110 THERAPEUTIC EXERCISES: CPT

## 2019-09-03 NOTE — THERAPY TREATMENT NOTE
Outpatient Physical Therapy Ortho Treatment Note  [facility]     Patient Name: Akua Alvarado  : 1970  MRN: [id number]  Today's Date: 9/3/2019      Visit Date: 2019    Visit Dx:    ICD-10-CM ICD-9-CM   1. Balance problem R26.89 781.99   2. Gait instability R26.81 781.2       There is no problem list on file for this patient.       No past medical history on file.     No past surgical history on file.                    PT Assessment/Plan     Row Name 19 1520          PT Assessment    Assessment Comments  Pt much improved confidence and stability with gait and dynamic balance tasks. Much improved confidence within // bars so attempted open environment challenges with greater need for assist. Pt fearful/unsteady with repeated ipsilateral backwards/forwards steps but able to perform retro walking without difficulty. Pt reporting no falls and no use of AD since initiating therapy. Will advance and finalize HEP next session before d/c.   -LB        PT Plan    PT Plan Comments  finalize HEP and d/c.   -LB       User Key  (r) = Recorded By, (t) = Taken By, (c) = Cosigned By    Initials Name Provider Type    LB Benita Oconnor, PT Physical Therapist            OP Exercises     Row Name 19 1400             Subjective Comments    Subjective Comments  I am doing much better. I still go up and down steps both feet on each step but I don't even touch my cane anymore.  -LB         Subjective Pain    Able to rate subjective pain?  yes  -LB      Pre-Treatment Pain Level  0  -LB         Total Minutes    92213 - PT Therapeutic Exercise Minutes  10  -LB      53879 -  PT Neuromuscular Reeducation Minutes  30  -LB         Exercise 1    Exercise Name 1  Nu step  -LB      Sets 1  lvl 5  -LB      Time 1  5 min  -LB         Exercise 2    Exercise Name 2  repeated forward/retro step same side  -LB      Reps 2  10  -LB      Time 2  --  -LB      Additional Comments  each LE leading  -LB         Exercise 3     "Exercise Name 3  AR press  -LB      Sets 3  blue foam  -LB      Reps 3  20 B  -LB      Time 3  RTB  -LB      Additional Comments  CGA to min A w/ gait belt  -LB         Exercise 4    Exercise Name 4  SLS in // bars  -LB      Cueing 4  Verbal  -LB      Reps 4  3 B  -LB      Time 4  15s  -LB      Additional Comments  occassional UE support  -LB         Exercise 6    Exercise Name 6  walking in // bars tossingg #1 med ball to PT  -LB      Cueing 6  Verbal;Demo  -LB      Reps 6  4 laps  -LB      Additional Comments  lateral  -LB         Exercise 7    Exercise Name 7  MS on blue foam  -LB      Sets 7  2  -LB      Reps 7  10  -LB         Exercise 8    Exercise Name 8  tband shldr ext  -LB      Cueing 8  Verbal  -LB      Sets 8  2  -LB      Reps 8  10  -LB      Time 8  rtb  -LB      Additional Comments  blue foam  -LB         Exercise 9    Exercise Name 9  fwd step up   -LB      Cueing 9  Verbal  -LB      Reps 9  20 B  -LB      Time 9  8\" step + blue foam  -LB         Exercise 10    Exercise Name 10  wobble board  -LB      Cueing 10  Verbal  -LB      Sets 10  1 ea  -LB      Reps 10  med/lat and ant/post  -LB      Time 10  one UE support med/lat; no UE support ant/post  -LB      Additional Comments  CGA with gait belt  -LB         Exercise 11    Exercise Name 11  --  -LB      Cueing 11  --  -LB      Sets 11  --  -LB      Time 11  --  -LB         Exercise 12    Exercise Name 12  trunk rotation while standing on blue foam  -LB      Cueing 12  Verbal  -LB      Reps 12  x20 B  -LB      Time 12  #1 ball  -LB      Additional Comments  CGA tandem stance  -LB         Exercise 13    Exercise Name 13  k[place] in // bars  -LB      Reps 13  3 laps  -LB         Exercise 14    Exercise Name 14  speed change in back hallway  -LB      Cueing 14  --  -LB      Reps 14  3 laps in // bars  -LB      Time 14  --  -LB      Additional Comments  10' slow, fast, slow  -LB         Exercise 15    Exercise Name 15  high marches  -LB      Cueing 15  " Verbal  -LB      Reps 15  3 laps in // bars  -LB      Time 15  no UE support  -LB      Additional Comments  2 second pause per LE  -LB         Exercise 16    Exercise Name 16  lateral walking with tband at knees   -LB      Reps 16  3 laps  -LB      Additional Comments  GTB  -LB        User Key  (r) = Recorded By, (t) = Taken By, (c) = Cosigned By    Initials Name Provider Type    LB Benita Oconnor, PT Physical Therapist                       PT OP Goals     Row Name 09/03/19 1500          PT Short Term Goals    STG Date to Achieve  07/25/19  -LB     STG 1  Pt will demonstrate understanding and compliance with initial HEP.  -LB     STG 1 Progress  Met;Ongoing  -LB     STG 2  Pt will demonstrate HR x 10 B in standing.  -LB     STG 2 Progress  Ongoing  -LB     STG 3  Pt will ambulate with SC for all community ambulation to reduce her risk of falls.  -LB     STG 3 Progress  Ongoing  -LB        Long Term Goals    LTG Date to Achieve  08/10/19  -LB     LTG 1  Pt will report 0 falls in last 2 weeks.  -LB     LTG 1 Progress  Ongoing  -LB     LTG 2  Pt will demonstrate ankle PF/DF strength improved to 4/5 or better to improve her stability.  -LB     LTG 2 Progress  Ongoing  -LB     LTG 3  Pt will demonstrate improved Elliott Balance score from 39 to 44 or better to reduce her to low fall risk.  -LB     LTG 3 Progress  Met  -LB       User Key  (r) = Recorded By, (t) = Taken By, (c) = Cosigned By    Initials Name Provider Type    LB Benita Oconnor, PT Physical Therapist          Therapy Education  Given: Symptoms/condition management, Mobility training  Program: Reinforced  How Provided: Verbal, Demonstration  Provided to: Patient  Level of Understanding: Teach back education performed, Verbalized, Demonstrated              Time Calculation:   Start Time: 1430  Stop Time: 1515  Time Calculation (min): 45 min  Total Timed Code Minutes- PT: 40 minute(s)  Therapy Charges for Today     Code Description Service Date Service Provider  Modifiers Qty    62360521481 HC PT NEUROMUSC RE EDUCATION EA 15 MIN 9/3/2019 Benita Oconnor, PT GP 2    61524868684 HC PT THER PROC EA 15 MIN 9/3/2019 Benita Oconnor, PT GP 1                    Benita Oconnor, PT  9/3/2019

## 2019-09-06 ENCOUNTER — HOSPITAL ENCOUNTER (OUTPATIENT)
Dept: PHYSICAL THERAPY | Facility: HOSPITAL | Age: 49
Setting detail: THERAPIES SERIES
Discharge: HOME | End: 2019-09-06

## 2019-09-06 DIAGNOSIS — R26.81 GAIT INSTABILITY: ICD-10-CM

## 2019-09-06 DIAGNOSIS — R26.89 BALANCE PROBLEM: Primary | ICD-10-CM

## 2019-09-06 PROCEDURE — 97110 THERAPEUTIC EXERCISES: CPT

## 2019-09-06 PROCEDURE — 97112 NEUROMUSCULAR REEDUCATION: CPT

## 2019-09-06 NOTE — THERAPY DISCHARGE NOTE
Outpatient Physical Therapy Ortho Treatment Note/Discharge Summary  [facility]     Patient Name: Akua Alvarado  : 1970  MRN: [id number]  Today's Date: 2019      Visit Date: 2019    Visit Dx:    ICD-10-CM ICD-9-CM   1. Balance problem R26.89 781.99   2. Gait instability R26.81 781.2       There is no problem list on file for this patient.       No past medical history on file.     No past surgical history on file.                    PT Assessment/Plan     Row Name 19 1522          PT Assessment    Assessment Comments  Pt participated in 12 skilled PT sessions to address dec balance and gait deficits. She has progressed well with static and dynamic balance tasks and has progressed to all community ambulation independently with 0 falls reported since initiating therapy. SHe demonstrates ability to maintain tandem stance for 30 seconds B. She has inc confidence with all community gait and reports ability to negotiate curbs and perform work duties. Pt understands HEP and is compliant. She is appropriate for d/c today to HEP.   -LB        PT Plan    PT Plan Comments  d/c to HEP  -LB       User Key  (r) = Recorded By, (t) = Taken By, (c) = Cosigned By    Initials Name Provider Type    LB Benita Oconnor, PT Physical Therapist              OP Exercises     Row Name 19 1500             Subjective Comments    Subjective Comments  I walked to [facility] and it was uneven and I didn't stumble at all.  -LB         Subjective Pain    Able to rate subjective pain?  yes  -LB      Pre-Treatment Pain Level  0  -LB         Total Minutes    74803 - PT Therapeutic Exercise Minutes  10  -LB      80933 -  PT Neuromuscular Reeducation Minutes  30  -LB         Exercise 1    Exercise Name 1  Nu step  -LB      Sets 1  lvl 5  -LB      Time 1  5 min  -LB         Exercise 2    Exercise Name 2  repeated forward/retro step same side  -LB      Reps 2  10  -LB      Additional Comments  each LE leading on/off blue  "foam  -LB         Exercise 3    Exercise Name 3  AR press  -LB      Sets 3  blue foam  -LB      Reps 3  20 B  -LB      Time 3  RTB  -LB      Additional Comments  CGA to min A w/ gait belt  -LB         Exercise 4    Exercise Name 4  SLS in // bars  -LB      Cueing 4  Verbal  -LB      Reps 4  3 B  -LB      Time 4  15s  -LB         Exercise 6    Exercise Name 6  walking in open environment  -LB      Cueing 6  Verbal;Demo  -LB      Reps 6  4 laps  -LB      Additional Comments  lateral  -LB         Exercise 7    Exercise Name 7  MS on blue foam  -LB      Sets 7  2  -LB      Reps 7  10  -LB         Exercise 8    Exercise Name 8  tband shldr ext  -LB      Cueing 8  Verbal  -LB      Sets 8  2  -LB      Reps 8  10  -LB      Time 8  rtb  -LB      Additional Comments  blue foam  -LB         Exercise 9    Exercise Name 9  fwd step up   -LB      Cueing 9  Verbal  -LB      Reps 9  20 B  -LB      Time 9  8\" step + blue foam  -LB         Exercise 10    Exercise Name 10  wobble board  -LB      Cueing 10  Verbal  -LB      Sets 10  1 ea  -LB      Reps 10  med/lat and ant/post  -LB      Time 10  one UE support med/lat; no UE support ant/post  -LB         Exercise 12    Exercise Name 12  trunk rotation while standing on blue foam  -LB      Cueing 12  Verbal  -LB      Reps 12  x20 B  -LB      Time 12  #1 ball  -LB         Exercise 13    Exercise Name 13  karoake in // bars  -LB      Reps 13  3 laps  -LB         Exercise 14    Exercise Name 14  speed change in back hallway  -LB      Reps 14  3 laps in // bars  -LB         Exercise 15    Exercise Name 15  high marches  -LB      Cueing 15  Verbal  -LB      Reps 15  2 laps in open environment  -LB      Time 15  no UE support  -LB        User Key  (r) = Recorded By, (t) = Taken By, (c) = Cosigned By    Initials Name Provider Type    Benita Mcintosh, PT Physical Therapist                         PT OP Goals     Row Name 09/06/19 1500          PT Short Term Goals    STG Date to Achieve  " 07/25/19  -LB     STG 1  Pt will demonstrate understanding and compliance with initial HEP.  -LB     STG 1 Progress  Met  -LB     STG 2  Pt will demonstrate HR x 10 B in standing.  -LB     STG 2 Progress  Met  -LB     STG 3  Pt will ambulate with SC for all community ambulation to reduce her risk of falls.  -LB     STG 3 Progress  Met  -LB     STG 3 Progress Comments  No falls in last month, no use of SC.  -LB        Long Term Goals    LTG Date to Achieve  08/10/19  -LB     LTG 1  Pt will report 0 falls in last 2 weeks.  -LB     LTG 1 Progress  Met  -LB     LTG 2  Pt will demonstrate ankle PF/DF strength improved to 4/5 or better to improve her stability.  -LB     LTG 2 Progress  Partially Met  -LB     LTG 2 Progress Comments  dec DF strength B  -LB     LTG 3  Pt will demonstrate improved Elliott Balance score from 39 to 44 or better to reduce her to low fall risk.  -LB     LTG 3 Progress  Met  -LB       User Key  (r) = Recorded By, (t) = Taken By, (c) = Cosigned By    Initials Name Provider Type    Benita Mcintosh, PT Physical Therapist          Therapy Education  Education Details: discussed ramp negotiation, continued management   Given: Symptoms/condition management  Program: Reinforced  How Provided: Verbal, Demonstration  Provided to: Patient  Level of Understanding: Teach back education performed, Verbalized, Demonstrated              Time Calculation:   Start Time: 1445  Stop Time: 1530  Time Calculation (min): 45 min  Total Timed Code Minutes- PT: 43 minute(s)  Therapy Charges for Today     Code Description Service Date Service Provider Modifiers Qty    45992828405 HC PT NEUROMUSC RE EDUCATION EA 15 MIN 9/6/2019 Benita Oconnor, PT GP 2    15826934067 HC PT THER PROC EA 15 MIN 9/6/2019 Benita Oconnor, PT GP 1                OP PT Discharge Summary  Date of Discharge: 09/06/19  Reason for Discharge: All goals achieved, Independent  Outcomes Achieved: Able to achieve all goals within established  timeline  Discharge Destination: Home with home program  Discharge Instructions/Additional Comments: d/c to independent program      Benita Oconnor, PT  9/6/2019

## 2022-02-17 ENCOUNTER — OFFICE VISIT (OUTPATIENT)
Dept: INTERNAL MEDICINE | Facility: CLINIC | Age: 52
End: 2022-02-17

## 2022-02-17 VITALS
SYSTOLIC BLOOD PRESSURE: 122 MMHG | WEIGHT: 256.4 LBS | OXYGEN SATURATION: 95 % | BODY MASS INDEX: 37.98 KG/M2 | HEIGHT: 69 IN | HEART RATE: 95 BPM | TEMPERATURE: 98.4 F | DIASTOLIC BLOOD PRESSURE: 76 MMHG

## 2022-02-17 DIAGNOSIS — I26.99 OTHER PULMONARY EMBOLISM WITHOUT ACUTE COR PULMONALE, UNSPECIFIED CHRONICITY: ICD-10-CM

## 2022-02-17 DIAGNOSIS — Z76.89 ENCOUNTER TO ESTABLISH CARE: Primary | ICD-10-CM

## 2022-02-17 DIAGNOSIS — E11.9 TYPE 2 DIABETES MELLITUS WITHOUT COMPLICATION, WITHOUT LONG-TERM CURRENT USE OF INSULIN: ICD-10-CM

## 2022-02-17 DIAGNOSIS — E04.1 LEFT THYROID NODULE: ICD-10-CM

## 2022-02-17 PROBLEM — G47.33 OBSTRUCTIVE SLEEP APNEA SYNDROME: Status: ACTIVE | Noted: 2022-02-17

## 2022-02-17 PROBLEM — I10 PRIMARY HYPERTENSION: Status: ACTIVE | Noted: 2022-02-17

## 2022-02-17 PROBLEM — E78.49 OTHER HYPERLIPIDEMIA: Status: ACTIVE | Noted: 2022-02-17

## 2022-02-17 PROCEDURE — 99204 OFFICE O/P NEW MOD 45 MIN: CPT | Performed by: FAMILY MEDICINE

## 2022-02-17 RX ORDER — METFORMIN HYDROCHLORIDE 750 MG/1
750 TABLET, EXTENDED RELEASE ORAL DAILY
COMMUNITY
Start: 2022-02-04

## 2022-02-17 RX ORDER — DULAGLUTIDE 3 MG/.5ML
3 INJECTION, SOLUTION SUBCUTANEOUS
COMMUNITY
Start: 2021-12-20

## 2022-02-17 RX ORDER — GLIMEPIRIDE 4 MG/1
2 TABLET ORAL DAILY
COMMUNITY
Start: 2021-12-06

## 2022-02-17 RX ORDER — APIXABAN 5 MG/1
5 TABLET, FILM COATED ORAL 2 TIMES DAILY
COMMUNITY
Start: 2021-12-17

## 2022-02-17 RX ORDER — EMPAGLIFLOZIN, METFORMIN HYDROCHLORIDE 25; 1000 MG/1; MG/1
25-1000 TABLET, EXTENDED RELEASE ORAL DAILY
COMMUNITY
Start: 2021-12-22

## 2022-02-17 RX ORDER — TRIAMCINOLONE ACETONIDE 0.25 MG/G
0.03 CREAM TOPICAL DAILY
COMMUNITY
Start: 2022-02-08

## 2022-02-17 RX ORDER — SIMVASTATIN 10 MG
10 TABLET ORAL DAILY
COMMUNITY
Start: 2022-02-02

## 2022-02-17 NOTE — PROGRESS NOTES
"Chief Complaint  Establish Care, Rash (patient states she has a rash on her face from the face mask. ), and Earache (itching in both ears and feeling clogged)    Subjective    History of Present Illness {CC  Problem List  Visit  Diagnosis   Encounters  Notes  Medications  Labs  Result Review Imaging  Media :23}     Akua Alvarado presents to [facility] for   History of Present Illness   50 yo female present to establish care. She is new to me and this office. Previous provider retired.  She has a history of diabetes, hypertension, hyperlipidemia, sleep apnea, Non-hodgkins lymphoma, and PE.  She is following with Dr. Seals [facility]iology.   Diabetes controlled, following with Endocrinology. Tolerating current medication.    She has notice enlargement on R side of neck. She has had thyroid looked at previously with Hematology.  Mother present with her today unaware.    Unable to get Care everywhere to work during visit, will have to obtain records.      Social History     Socioeconomic History   • Marital status: Single   Tobacco Use   • Smoking status: Never Smoker   Vaping Use   • Vaping Use: Never used   Substance and Sexual Activity   • Alcohol use: Never   • Drug use: Never       Objective     Vital Signs:   /76 (BP Location: Right arm, Patient Position: Sitting, Cuff Size: Adult)   Pulse 95   Temp 98.4 °F (36.9 °C) (Temporal)   Ht 174 cm (68.5\")   Wt 116 kg (256 lb 6.4 oz)   SpO2 95%   BMI 38.41 kg/m²   Physical Exam  Constitutional:       General: She is not in acute distress.     Appearance: She is obese. She is not ill-appearing.   HENT:      Head: Normocephalic.   Neck:      Comments: + nodule on R side of neck, thyroid vs soft tissue mass  nontender  No erythema  Cardiovascular:      Rate and Rhythm: Regular rhythm.      Heart sounds: Normal heart sounds.   Pulmonary:      Effort: No respiratory distress.      Breath sounds: Normal breath sounds. No wheezing. "   Neurological:      Mental Status: She is alert.   Psychiatric:         Mood and Affect: Mood normal.        Result Review  Data Reviewed:{ Labs  Result Review  Imaging  Med Tab  Media :23}   The following data was reviewed by: Diamond Whitfield MD on 02/17/2022  No results for input(s): GLU, BUN, CREATININE, EGFRIFNONA, EGFRIFAFRI, NA, K, CL, CALCIUM, ALBUMIN, BILITOT, ALKPHOS, AST, ALT, CHLPL, TRIG, HDL, VLDL, LDL, LDLHDL, CKTOTAL, HGBA1C, MICROALBUR, WBC, RBC, HB, HCT, MCV, MCH, TSH, FREET4, PSA, INR, [id number], URICACID in the last 34919 hours.    Invalid input(s): PROTEINTOTREF, PLATELETS         Current Outpatient Medications:   •  Eliquis 5 MG tablet tablet, Take 5 mg by mouth 2 (Two) Times a Day., Disp: , Rfl:   •  glimepiride (AMARYL) 4 MG tablet, Take 2 mg by mouth Daily., Disp: , Rfl:   •  metFORMIN ER (GLUCOPHAGE-XR) 750 MG 24 hr tablet, Take 750 mg by mouth Daily., Disp: , Rfl:   •  simvastatin (ZOCOR) 10 MG tablet, Take 10 mg by mouth Daily., Disp: , Rfl:   •  Synjardy XR  MG tablet sustained-release 24 hour, Take 25-1,000 mg by mouth Daily., Disp: , Rfl:   •  triamcinolone (KENALOG) 0.025 % cream, Apply 0.025 application topically to the appropriate area as directed Daily., Disp: , Rfl:   •  Trulicity 3 MG/0.5ML solution pen-injector, 3 mg., Disp: , Rfl:      Assessment and Plan {CC Problem List  Visit Diagnosis  ROS  Review (Popup)  Health Maintenance  Quality  BestPractice  Medications  SmartSets  SnapShot Encounters  Media :23}   Problem List Items Addressed This Visit        Coag and Thromboembolic    Other pulmonary embolism without acute cor pulmonale (HCC)    Current Assessment & Plan     Chronic, stable  Continue current medication             Endocrine and Metabolic    Type 2 diabetes mellitus, without long-term current use of insulin (HCC)    Current Assessment & Plan     Diabetes is chronic, following with Endocrinology [facility].   Continue current treatment  regimen.  continue monitor diet, recheck labs next visit.   Diabetes will be reassessed in 3 months.         Relevant Medications    Synjardy XR  MG tablet sustained-release 24 hour    glimepiride (AMARYL) 4 MG tablet    metFORMIN ER (GLUCOPHAGE-XR) 750 MG 24 hr tablet    Trulicity 3 MG/0.5ML solution pen-injector      Other Visit Diagnoses     Encounter to establish care    -  Primary    Left thyroid nodule        Relevant Orders    US Head Neck Soft Tissue          Follow Up   Return in about 3 months (around 5/17/2022) for please have sign release for [facility], care everywhere is not working.  Patient was given instructions and counseling regarding her condition or for health maintenance advice. Please see specific information pulled into the AVS if appropriate.    EpicAct:MR_WS_AMB_ORDERS,RunParams:STARTUPTYPE=FOLLOW    MR_[email or web address]_DISCHARGE

## 2022-02-18 ENCOUNTER — TELEPHONE (OUTPATIENT)
Dept: INTERNAL MEDICINE | Facility: CLINIC | Age: 52
End: 2022-02-18

## 2022-02-18 NOTE — TELEPHONE ENCOUNTER
PATIENT MOTHER CALLING IN REGARDS TO REQUEST A HEAD NECK AND SOFT TISSUE TEST AT [facility]. PATIENT IS REQUESTING DR PAN FAX REQUEST OVER BY FAX TO SCHEDULE. PLEASE ADVISE THANK YOU!        FAX NUMBER [phone]

## 2022-02-20 PROBLEM — C85.90 NON HODGKIN'S LYMPHOMA: Status: ACTIVE | Noted: 2022-02-20

## 2022-02-21 NOTE — ASSESSMENT & PLAN NOTE
Diabetes is chronic, following with Endocrinology [facility].   Continue current treatment regimen.  continue monitor diet, recheck labs next visit.   Diabetes will be reassessed in 3 months.

## 2022-05-04 ENCOUNTER — TELEPHONE (OUTPATIENT)
Dept: INTERNAL MEDICINE | Facility: CLINIC | Age: 52
End: 2022-05-04

## 2022-05-04 NOTE — TELEPHONE ENCOUNTER
Caller: AI NOVOA    Relationship: Emergency Contact, MOTHER    Best call back number: [phone]     What medication are you requesting: METOPROLOL 50 MG 24 HOUR TAB.     If a prescription is needed, what is your preferred pharmacy and phone number: [facility] [place], [place] - [address] AT [facility] & ([address] ) - [phone] [facility] [phone] FX     Additional notes:   PATIENT'S MOTHER STATES THAT DR. PATRICIA SIMPSON PRESCRIBED THIS MEDICATION TO PATIENT, BUT DR. SIMPSON IS NOW RETIRED.    IF ANY PROBLEMS, PLEASE CONTACT MOTHER.

## 2022-05-06 RX ORDER — METOPROLOL SUCCINATE 50 MG/1
50 TABLET, EXTENDED RELEASE ORAL DAILY
Qty: 30 TABLET | Refills: 3 | OUTPATIENT
Start: 2022-05-06

## 2022-05-06 RX ORDER — METOPROLOL SUCCINATE 50 MG/1
50 TABLET, EXTENDED RELEASE ORAL DAILY
Qty: 30 TABLET | Refills: 3 | Status: SHIPPED | OUTPATIENT
Start: 2022-05-06 | End: 2022-09-08

## 2022-05-06 NOTE — TELEPHONE ENCOUNTER
Caller: AI NOVOA    Relationship: Emergency Contact    Best call back number: [phone]    Requested Prescriptions:   Requested Prescriptions     Pending Prescriptions Disp Refills   • metoprolol succinate XL (Toprol XL) 50 MG 24 hr tablet 30 tablet 3     Sig: Take 1 tablet by mouth Daily.        Pharmacy where request should be sent: [facility] [place] [address] AT [facility] & ([address] ) - [phone] [facility] [phone] FX     Additional details provided by patient: PATIENT'S MOTHER STATES SHE HAS A 1 WEEK SUPPLY REMAINING    Does the patient have less than a 3 day supply:  [] Yes  [x] No    Monroe Aguila Rep   05/06/22 14:34 EDT

## 2022-05-24 ENCOUNTER — OFFICE VISIT (OUTPATIENT)
Dept: INTERNAL MEDICINE | Facility: CLINIC | Age: 52
End: 2022-05-24

## 2022-05-24 VITALS
DIASTOLIC BLOOD PRESSURE: 62 MMHG | HEART RATE: 85 BPM | SYSTOLIC BLOOD PRESSURE: 104 MMHG | WEIGHT: 259 LBS | OXYGEN SATURATION: 98 % | HEIGHT: 69 IN | BODY MASS INDEX: 38.36 KG/M2

## 2022-05-24 DIAGNOSIS — I10 PRIMARY HYPERTENSION: Primary | ICD-10-CM

## 2022-05-24 DIAGNOSIS — C85.90 NON-HODGKIN'S LYMPHOMA, UNSPECIFIED BODY REGION, UNSPECIFIED NON-HODGKIN LYMPHOMA TYPE: ICD-10-CM

## 2022-05-24 DIAGNOSIS — E66.01 MORBID OBESITY: ICD-10-CM

## 2022-05-24 PROCEDURE — 99214 OFFICE O/P EST MOD 30 MIN: CPT | Performed by: FAMILY MEDICINE

## 2022-05-24 RX ORDER — OMEGA-3S/DHA/EPA/FISH OIL/D3 300MG-1000
2000 CAPSULE ORAL DAILY
COMMUNITY

## 2022-05-24 RX ORDER — UBIDECARENONE 75 MG
100 CAPSULE ORAL DAILY
COMMUNITY

## 2022-05-24 RX ORDER — MULTIPLE VITAMINS W/ MINERALS TAB 9MG-400MCG
1 TAB ORAL DAILY
COMMUNITY

## 2022-05-24 NOTE — ASSESSMENT & PLAN NOTE
Following with oncology, stable per patient  She seen by a new provider recently as Dr. Varner no longer practicing with [facility]  Continue follow up as scheduled

## 2022-05-24 NOTE — PROGRESS NOTES
"    Chief Complaint  Diabetes, Hyperlipidemia, and Hypertension    Subjective    History of Present Illness {CC  Problem List  Visit  Diagnosis   Encounters  Notes  Medications  Labs  Result Review Imaging  Media :23}     Ceferino Alvarado presents to [facility] for   History of Present Illness   52 yo female presents for follow up visit. Pt states she is doing well.  Seen Endocrine and hematology since last visit. No changes. She had the US of thyroid stable findings. No issues swallowing   Taking blood pressure medication as prescribed. Currently taking metoprolol. Took off other medication.         Social History     Socioeconomic History   • Marital status: Single   Tobacco Use   • Smoking status: Never Smoker   Vaping Use   • Vaping Use: Never used   Substance and Sexual Activity   • Alcohol use: Never   • Drug use: Never   • Sexual activity: Never      Objective     Vital Signs:   /62   Pulse 85   Ht 174 cm (68.5\")   Wt 117 kg (259 lb)   SpO2 98%   BMI 38.81 kg/m²      Physical Exam  Constitutional:       General: She is not in acute distress.     Appearance: She is not ill-appearing.   HENT:      Head: Normocephalic.   Eyes:      Conjunctiva/sclera: Conjunctivae normal.      Comments: Wearing glasses   Cardiovascular:      Rate and Rhythm: Regular rhythm.      Heart sounds: Normal heart sounds.   Pulmonary:      Effort: No respiratory distress.      Breath sounds: Normal breath sounds. No wheezing.   Musculoskeletal:      Right lower leg: Edema present.      Left lower leg: Edema present.   Neurological:      Mental Status: She is alert.        Result Review  Data Reviewed:{ Labs  Result Review  Imaging  Med Tab  Media :23}   The following data was reviewed by: Diamond Whitfield MD on 05/24/2022  Lab Results - Last 18 Months   Lab Units 04/21/22  1147 04/07/21  1048 02/18/21  0941 12/24/20  0954   GLUCOSE mg/dL  --   --  248* 187*   BUN mg/dL  --   --  15 " 21*   CREATININE mg/dL  --   --  0.80 0.90   SODIUM mmol/L  --   --  143 139   POTASSIUM mmol/L  --   --  4.4 4.4   CHLORIDE mmol/L  --   --  102 104   CALCIUM mg/dL  --   --  9.7 9.8   ALBUMIN g/dL  --   --  3.8 3.6   BILIRUBIN mg/dL  --   --  0.2 0.4   ALK PHOS U/L  --   --  87 62   AST (SGOT) U/L  --   --  48* 35   ALT (SGPT) U/L  --   --  37* 31   WBC 10*3/uL 8.78  --  8.45 9.84   RBC 10*6/uL 4.20  --  3.91* 3.81*   HEMATOCRIT % 41.4  --  39.6 38.4   MCV fL 98.6  --  101.3* 100.8*   MCH pg 31.0  --  31.7 32.5   VIT D 25 HYDROXY ng/mL  --  21*  --   --               Current Outpatient Medications:   •  cholecalciferol (VITAMIN D3) 10 MCG (400 UNIT) tablet, Take 2,000 Units by mouth Daily., Disp: , Rfl:   •  Eliquis 5 MG tablet tablet, Take 5 mg by mouth 2 (Two) Times a Day., Disp: , Rfl:   •  glimepiride (AMARYL) 4 MG tablet, Take 2 mg by mouth Daily., Disp: , Rfl:   •  metFORMIN ER (GLUCOPHAGE-XR) 750 MG 24 hr tablet, Take 750 mg by mouth Daily., Disp: , Rfl:   •  metoprolol succinate XL (Toprol XL) 50 MG 24 hr tablet, Take 1 tablet by mouth Daily., Disp: 30 tablet, Rfl: 3  •  multivitamin with minerals tablet tablet, Take 1 tablet by mouth Daily., Disp: , Rfl:   •  simvastatin (ZOCOR) 10 MG tablet, Take 10 mg by mouth Daily., Disp: , Rfl:   •  Synjardy XR  MG tablet sustained-release 24 hour, Take 25-1,000 mg by mouth Daily., Disp: , Rfl:   •  triamcinolone (KENALOG) 0.025 % cream, Apply 0.025 application topically to the appropriate area as directed Daily., Disp: , Rfl:   •  Trulicity 3 MG/0.5ML solution pen-injector, 3 mg., Disp: , Rfl:   •  vitamin B-12 (CYANOCOBALAMIN) 100 MCG tablet, Take 100 mcg by mouth Daily., Disp: , Rfl:       Assessment and Plan {CC Problem List  Visit Diagnosis  ROS  Review (Popup)  Health Maintenance  Quality  BestPractice  Medications  SmartSets  SnapShot Encounters  Media :23}   Problem List Items Addressed This Visit        Cardiac and Vasculature    Primary  hypertension - Primary    Current Assessment & Plan     Hypertension is chronic, stable currently taking metoprolol only.  Continue current treatment regimen.  Dietary sodium restriction.  Continue current medications.  Ambulatory blood pressure monitoring.  Blood pressure will be reassessed at the next regular appointment.              Endocrine and Metabolic    Morbid obesity (HCC)    Current Assessment & Plan     Patient's (Body mass index is 38.81 kg/m².) indicates that they are morbidly obese (BMI > 40 or > 35 with obesity - related health condition) with health conditions that include obstructive sleep apnea, hypertension and diabetes mellitus . Weight is chronic. BMI is is above average; BMI management plan is completed. We discussed portion control, increasing exercise and exercising at work three days a week.  Working at [employer].               Hematology and Neoplasia    Non Hodgkin's lymphoma (HCC)    Current Assessment & Plan     Following with oncology, stable per patient  She seen by a new provider recently as Dr. Varner no longer practicing with [facility]  Continue follow up as scheduled                 Follow Up   Return in about 4 months (around 9/24/2022) for Medicare Wellness.  Patient was given instructions and counseling regarding her condition or for health maintenance advice. Please see specific information pulled into the AVS if appropriate.    EpicAct:MR_WS_AMB_ORDERS,RunParams:STARTUPTYPE=FOLLOW    MR_WS_AMB_DISCHARGE

## 2022-05-24 NOTE — ASSESSMENT & PLAN NOTE
Hypertension is chronic, stable currently taking metoprolol only.  Continue current treatment regimen.  Dietary sodium restriction.  Continue current medications.  Ambulatory blood pressure monitoring.  Blood pressure will be reassessed at the next regular appointment.

## 2022-05-24 NOTE — ASSESSMENT & PLAN NOTE
Patient's (Body mass index is 38.81 kg/m².) indicates that they are morbidly obese (BMI > 40 or > 35 with obesity - related health condition) with health conditions that include obstructive sleep apnea, hypertension and diabetes mellitus . Weight is chronic. BMI is is above average; BMI management plan is completed. We discussed portion control, increasing exercise and exercising at work three days a week.  Working at [employer].

## 2022-09-08 RX ORDER — METOPROLOL SUCCINATE 50 MG/1
TABLET, EXTENDED RELEASE ORAL
Qty: 30 TABLET | Refills: 3 | Status: SHIPPED | OUTPATIENT
Start: 2022-09-08 | End: 2023-01-04

## 2022-12-19 RX ORDER — METOPROLOL SUCCINATE 50 MG/1
TABLET, EXTENDED RELEASE ORAL
Qty: 30 TABLET | Refills: 3 | OUTPATIENT
Start: 2022-12-19

## 2022-12-28 RX ORDER — METOPROLOL SUCCINATE 50 MG/1
TABLET, EXTENDED RELEASE ORAL
Qty: 30 TABLET | Refills: 3 | OUTPATIENT
Start: 2022-12-28

## 2023-01-04 RX ORDER — METOPROLOL SUCCINATE 50 MG/1
TABLET, EXTENDED RELEASE ORAL
Qty: 30 TABLET | Refills: 0 | Status: SHIPPED | OUTPATIENT
Start: 2023-01-04 | End: 2023-01-20

## 2023-01-20 RX ORDER — METOPROLOL SUCCINATE 50 MG/1
TABLET, EXTENDED RELEASE ORAL
Qty: 30 TABLET | Refills: 0 | Status: SHIPPED | OUTPATIENT
Start: 2023-01-20 | End: 2023-03-10 | Stop reason: SDUPTHER

## 2023-01-20 NOTE — TELEPHONE ENCOUNTER
Please call advise pt I have refilled one month supply. I need her to make an appointment for follow up to continue refills.

## 2023-02-27 RX ORDER — METOPROLOL SUCCINATE 50 MG/1
TABLET, EXTENDED RELEASE ORAL
Qty: 30 TABLET | Refills: 0 | OUTPATIENT
Start: 2023-02-27

## 2023-02-27 NOTE — TELEPHONE ENCOUNTER
Called patient, LVM w/ provider instructions to schedule follow up appointment for medication refills. A message was sent throughTicketfly on 01/20/23 advising patient that the provider refilled prescription for 30 days and would need a follow up to receive future refills.

## 2023-02-27 NOTE — TELEPHONE ENCOUNTER
I sent a thirty day supply last month and advised at that time to make an appt. Please call advise need to make an appt for refill.    No significant past surgical history

## 2023-03-10 ENCOUNTER — DOCUMENTATION (OUTPATIENT)
Dept: INTERNAL MEDICINE | Facility: CLINIC | Age: 53
End: 2023-03-10
Payer: MEDICARE

## 2023-03-10 RX ORDER — METOPROLOL SUCCINATE 50 MG/1
50 TABLET, EXTENDED RELEASE ORAL DAILY
Qty: 30 TABLET | Refills: 0 | Status: SHIPPED | OUTPATIENT
Start: 2023-03-10 | End: 2023-04-04 | Stop reason: SDUPTHER

## 2023-04-04 ENCOUNTER — DOCUMENTATION (OUTPATIENT)
Dept: INTERNAL MEDICINE | Facility: CLINIC | Age: 53
End: 2023-04-04
Payer: MEDICARE

## 2023-04-04 RX ORDER — METOPROLOL SUCCINATE 50 MG/1
50 TABLET, EXTENDED RELEASE ORAL DAILY
Qty: 90 TABLET | Refills: 0 | Status: SHIPPED | OUTPATIENT
Start: 2023-04-04

## 2023-04-10 RX ORDER — METOPROLOL SUCCINATE 50 MG/1
TABLET, EXTENDED RELEASE ORAL
Qty: 30 TABLET | OUTPATIENT
Start: 2023-04-10

## 2023-06-13 ENCOUNTER — TRANSCRIBE ORDERS (OUTPATIENT)
Dept: PHYSICAL THERAPY | Facility: HOSPITAL | Age: 53
End: 2023-06-13
Payer: MEDICARE

## 2023-06-13 DIAGNOSIS — G89.29 CHRONIC MIDLINE LOW BACK PAIN WITH BILATERAL SCIATICA: ICD-10-CM

## 2023-06-13 DIAGNOSIS — G89.29 CHRONIC PAIN OF BOTH KNEES: Primary | ICD-10-CM

## 2023-06-13 DIAGNOSIS — M25.561 CHRONIC PAIN OF BOTH KNEES: Primary | ICD-10-CM

## 2023-06-13 DIAGNOSIS — M54.41 CHRONIC MIDLINE LOW BACK PAIN WITH BILATERAL SCIATICA: ICD-10-CM

## 2023-06-13 DIAGNOSIS — M54.42 CHRONIC MIDLINE LOW BACK PAIN WITH BILATERAL SCIATICA: ICD-10-CM

## 2023-06-13 DIAGNOSIS — M25.562 CHRONIC PAIN OF BOTH KNEES: Primary | ICD-10-CM

## 2023-07-24 ENCOUNTER — HOSPITAL ENCOUNTER (OUTPATIENT)
Dept: PHYSICAL THERAPY | Facility: HOSPITAL | Age: 53
Setting detail: THERAPIES SERIES
Discharge: HOME | End: 2023-07-24
Payer: MEDICARE

## 2023-07-24 DIAGNOSIS — R53.1 WEAKNESS GENERALIZED: ICD-10-CM

## 2023-07-24 DIAGNOSIS — M54.42 CHRONIC MIDLINE LOW BACK PAIN WITH BILATERAL SCIATICA: ICD-10-CM

## 2023-07-24 DIAGNOSIS — G89.29 CHRONIC MIDLINE LOW BACK PAIN WITH BILATERAL SCIATICA: ICD-10-CM

## 2023-07-24 DIAGNOSIS — M54.41 CHRONIC MIDLINE LOW BACK PAIN WITH BILATERAL SCIATICA: ICD-10-CM

## 2023-07-24 DIAGNOSIS — G89.29 CHRONIC PAIN OF BOTH KNEES: ICD-10-CM

## 2023-07-24 DIAGNOSIS — M25.561 CHRONIC PAIN OF BOTH KNEES: ICD-10-CM

## 2023-07-24 DIAGNOSIS — R29.6 FALLS FREQUENTLY: Primary | ICD-10-CM

## 2023-07-24 DIAGNOSIS — M25.562 CHRONIC PAIN OF BOTH KNEES: ICD-10-CM

## 2023-07-24 PROCEDURE — 97110 THERAPEUTIC EXERCISES: CPT

## 2023-07-24 PROCEDURE — 97112 NEUROMUSCULAR REEDUCATION: CPT

## 2023-07-24 NOTE — THERAPY TREATMENT NOTE
Outpatient Physical Therapy Ortho Treatment Note  Psychiatric     Patient Name: Ceferino Alvarado  : 1970  MRN: [id number]  Today's Date: 2023      Visit Date: 2023    Visit Dx:    ICD-10-CM ICD-9-CM   1. Falls frequently  R29.6 V15.88   2. Chronic midline low back pain with bilateral sciatica  M54.41 724.2    M54.42 724.3    G89.29 338.29   3. Weakness generalized  R53.1 780.79   4. Chronic pain of both knees  M25.561 719.46    M25.562 338.29    G89.29        Patient Active Problem List   Diagnosis    Primary hypertension    Type 2 diabetes mellitus, without long-term current use of insulin    Obstructive sleep apnea syndrome    Other hyperlipidemia    Other pulmonary embolism without acute cor pulmonale    Non Hodgkin's lymphoma    Morbid obesity        Past Medical History:   Diagnosis Date    Allergic     Arthritis     Cancer     Non-Hodgkins Lymphoma    Cataract     Cholelithiasis     Clotting disorder     Deep vein thrombosis     Diabetes mellitus     Hyperlipidemia     Hypertension     Obesity     Pulmonary embolism     Urinary tract infection         Past Surgical History:   Procedure Laterality Date    CARDIAC CATHETERIZATION      CHOLECYSTECTOMY      COLONOSCOPY      D & C AND LAPAROSCOPY      18 years ago    TONSILLECTOMY      age 2                        PT Assessment/Plan       Row Name 23 0900          PT Assessment    Assessment Comments Ceferino reports good response to ankle strengthening last visit. We updated HEP and printed for home. She noted today that she can do things she though she would not be able to do again. She is able to STS without UEs, carry laundry and walk down stairs, stand on her feet for 4 hours at work (and longer),  groceries from floor. Next visit we will review final HEPand reassess strength, outcome measures.  -JA        PT Plan    PT Plan Comments Assess: MMT, LEFS, FTSS  -JA               User Key  (r) = Recorded By, (t) = Taken By, (c) =  "Cosigned By      Initials Name Provider Type    Amy Skaggs, PT Physical Therapist                       OP Exercises       Row Name 07/24/23 0800             Subjective Comments    Subjective Comments Pain is better but the stiffness is \"15\" because  the same.  -JA         Subjective Pain    Able to rate subjective pain? yes  -JA      Pre-Treatment Pain Level 0  -JA      Subjective Pain Comment no pain just stiffness  -JA         Total Minutes    38882 - PT Therapeutic Exercise Minutes 30  -JA      25109 -  PT Neuromuscular Reeducation Minutes 10  -JA         Exercise 1    Exercise Name 1 Nustep  -JA      Cueing 1 Verbal  -JA      Time 1 5min  -JA         Exercise 2    Exercise Name 2 Swiss ball roll out from sitting on EOB, W  -JA      Cueing 2 Verbal  -JA      Reps 2 20  -JA      Additional Comments Ceferino reports she got a therapy ball for home  -JA         Exercise 3    Exercise Name 3 LTR  -JA      Cueing 3 Verbal  -JA      Reps 3 15 L/R  -JA         Exercise 4    Exercise Name 4 QS w/SLR  -JA      Cueing 4 Verbal;Tactile  -JA      Sets 4 2 BL  -JA      Reps 4 10  -JA         Exercise 5    Exercise Name 5 TA in HL  -JA      Cueing 5 Verbal  -JA      Reps 5 15  -JA      Time 5 3s  -JA         Exercise 6    Exercise Name 6 Beginner Bridge  -JA      Cueing 6 Verbal;Tactile  -JA      Sets 6 2  -JA      Reps 6 10  -JA         Exercise 7    Exercise Name 7 --  -JA      Cueing 7 --  -JA      Sets 7 --  -JA      Reps 7 --  -JA      Time 7 --  -JA         Exercise 8    Exercise Name 8 supine hip abd \"windshield wiper\"  -JA      Cueing 8 Verbal;Tactile  -JA      Reps 8 10  -JA         Exercise 9    Exercise Name 9 FW and BW walk  -JA      Reps 9 3 laps  -JA         Exercise 10    Exercise Name 10 high march stepping  -JA      Cueing 10 Verbal  -JA      Reps 10 --  -JA         Exercise 11    Exercise Name 11 STS w/tband above knees  -JA      Cueing 11 Verbal  -JA      Reps 11 10  -JA         Exercise 12    " Exercise Name 12 seated ham curls with resistance  -JA      Cueing 12 Verbal  -JA      Reps 12 15  -JA      Time 12 GTB  -JA         Exercise 13    Exercise Name 13 --  -JA      Reps 13 --  -JA         Exercise 14    Exercise Name 14 Around the clock toe taps  -JA      Cueing 14 Verbal;Tactile;Demo  -JA      Sets 14 --  -JA      Reps 14 3 reps  -JA      Time 14 2 sec  -JA      Additional Comments kept reps to tolerance of standing leg, she can gradually increase reps as able  -JA         Exercise 15    Exercise Name 15 HL hip add  -JA      Cueing 15 Verbal;Demo  -JA      Reps 15 20  -JA      Time 15 smll ball  -JA         Exercise 16    Exercise Name 16 Seated hip abd  -JA      Cueing 16 Verbal;Demo  -JA      Reps 16 15bil, 10 one side onlyR/L  -JA      Time 16 RTB  -JA         Exercise 17    Exercise Name 17 shld ROW  -JA      Sets 17 2  -JA      Reps 17 10  -JA      Time 17 GTB  -JA         Exercise 18    Exercise Name 18 seated ankle PF w/resistance band  -JA      Reps 18 15  -JA      Time 18 GTB  -JA                User Key  (r) = Recorded By, (t) = Taken By, (c) = Cosigned By      Initials Name Provider Type    Amy Skaggs, PT Physical Therapist                                  PT OP Goals       Row Name 07/24/23 0900          PT Short Term Goals    STG Date to Achieve 07/21/23  -JA     STG 1 Pt will demonstrate understanding and compliance with initial HEP.  -JA     STG 1 Progress Met  -JA     STG 2 Pt. Will score 20/80  (from 11/80 on initial evaluation) to indicate improved perception of disability.  -GALO     STG 2 Progress Met  -JA        Long Term Goals    LTG Date to Achieve 09/01/23  -JA     LTG 1 Pt. Will be independent with advanced HEP to improve long-term management of condition and independence.  -JA     LTG 1 Progress Met  -JA     LTG 2 Pt. Will score 30/80 (from 11/80 on initial evaluation) to indicate improved perception of disability.  -JA     LTG 2 Progress Ongoing  -JA     LTG 3 Pt  will demonstrate improved 5x STS performance from 24.3s to =/< 18s showing improved LE functional strength and a reduced risk of falling.  -GALO     LTG 3 Progress Ongoing  -GALO     LTG 4 Pt will improve BL LE strength to grossly 4+/5 to improve ability to complete all ADLs and reduce falls risk.  -GALO     LTG 4 Progress Ongoing  -GALO     LTG 5 Pt will report a reduction in max back pain levels to </= 5/10 (from 10/10 at initial eval) improving quality of life and ease of participation in daily activities.  -GALO     LTG 5 Progress Met  -GALO               User Key  (r) = Recorded By, (t) = Taken By, (c) = Cosigned By      Initials Name Provider Type    Amy Skaggs, PT Physical Therapist                    Therapy Education  Education Details: updated HEP and printed for home, some ex's to be done 3x/wk only  Given: HEP, Symptoms/condition management, Posture/body mechanics  Program: Reinforced, Progressed  How Provided: Verbal, Demonstration, Written  Provided to: Patient  Level of Understanding: Verbalized, Demonstrated              Time Calculation:   Start Time: 0845  Stop Time: 0930  Time Calculation (min): 45 min  Timed Charges  41728 - PT Therapeutic Exercise Minutes: 30  78805 -  PT Neuromuscular Reeducation Minutes: 10  Total Minutes  Timed Charges Total Minutes: 40   Total Minutes: 40  Therapy Charges for Today       Code Description Service Date Service Provider Modifiers Qty    91492307647  PT NEUROMUSC RE EDUCATION EA 15 MIN 7/24/2023 Amy Woodall, PT GP 1    62657958804 HC PT THER PROC EA 15 MIN 7/24/2023 Amy Woodall PT GP 2                      Amy Woodall PT  7/24/2023

## 2023-07-27 ENCOUNTER — HOSPITAL ENCOUNTER (OUTPATIENT)
Dept: PHYSICAL THERAPY | Facility: HOSPITAL | Age: 53
Setting detail: THERAPIES SERIES
Discharge: HOME | End: 2023-07-27
Payer: MEDICARE

## 2023-07-27 DIAGNOSIS — M54.42 CHRONIC MIDLINE LOW BACK PAIN WITH BILATERAL SCIATICA: ICD-10-CM

## 2023-07-27 DIAGNOSIS — R29.6 FALLS FREQUENTLY: Primary | ICD-10-CM

## 2023-07-27 DIAGNOSIS — M25.562 CHRONIC PAIN OF BOTH KNEES: ICD-10-CM

## 2023-07-27 DIAGNOSIS — M25.561 CHRONIC PAIN OF BOTH KNEES: ICD-10-CM

## 2023-07-27 DIAGNOSIS — G89.29 CHRONIC MIDLINE LOW BACK PAIN WITH BILATERAL SCIATICA: ICD-10-CM

## 2023-07-27 DIAGNOSIS — G89.29 CHRONIC PAIN OF BOTH KNEES: ICD-10-CM

## 2023-07-27 DIAGNOSIS — R53.1 WEAKNESS GENERALIZED: ICD-10-CM

## 2023-07-27 DIAGNOSIS — M54.41 CHRONIC MIDLINE LOW BACK PAIN WITH BILATERAL SCIATICA: ICD-10-CM

## 2023-07-27 PROCEDURE — 97112 NEUROMUSCULAR REEDUCATION: CPT

## 2023-07-27 PROCEDURE — 97110 THERAPEUTIC EXERCISES: CPT

## 2023-07-27 NOTE — THERAPY DISCHARGE NOTE
Outpatient Physical Therapy Ortho Treatment Note/Discharge Summary  [facility]     Patient Name: Ceferino Alvarado  : 1970  MRN: [id number]  Today's Date: 2023      Visit Date: 2023    Visit Dx:    ICD-10-CM ICD-9-CM   1. Falls frequently  R29.6 V15.88   2. Chronic midline low back pain with bilateral sciatica  M54.41 724.2    M54.42 724.3    G89.29 338.29   3. Weakness generalized  R53.1 780.79   4. Chronic pain of both knees  M25.561 719.46    M25.562 338.29    G89.29        Patient Active Problem List   Diagnosis    Primary hypertension    Type 2 diabetes mellitus, without long-term current use of insulin    Obstructive sleep apnea syndrome    Other hyperlipidemia    Other pulmonary embolism without acute cor pulmonale    Non Hodgkin's lymphoma    Morbid obesity        Past Medical History:   Diagnosis Date    Allergic     Arthritis     Cancer     Non-Hodgkins Lymphoma    Cataract     Cholelithiasis     Clotting disorder     Deep vein thrombosis     Diabetes mellitus     Hyperlipidemia     Hypertension     Obesity     Pulmonary embolism     Urinary tract infection         Past Surgical History:   Procedure Laterality Date    CARDIAC CATHETERIZATION      CHOLECYSTECTOMY      COLONOSCOPY      D & C AND LAPAROSCOPY      18 years ago    TONSILLECTOMY      age 2        PT Ortho       Row Name 23 1400       Myotomal Screen- Lower Quarter Clearing    Hip flexion (L2) Right:;4- (Good -);Left:;4 (Good)  -JA    Knee extension (L3) Right:;4 (Good);Left:;4+ (Good +)  -JA    Ankle DF (L4) Right:;3+ (Fair +);Left:;4- (Good -)  -JA    Ankle PF (S1) Bilateral:;5 (Normal)  -JA    Knee flexion (S2) Right:;4+ (Good +);Left:;5 (Normal)  -JA              User Key  (r) = Recorded By, (t) = Taken By, (c) = Cosigned By      Initials Name Provider Type    Amy Skaggs, PT Physical Therapist                                 PT Assessment/Plan       Row Name 23 1400          PT Assessment     Assessment Comments Ceferino Alvarado has been seen for a total of 8 visits for chronic Silvia knee pain and LBP with silvia radiculopathy. She reports she can clean her house, take out the trash, bend to  something from the floor, is no longer a problem. Progress toward goals was good, all STGs met, 4/5 LTGs met/partially met and only goal not met was FTSS, her score imporved but not to goal. She is pleased with her progress and is ready to d/c to HEP and plans to continue doing exercises.  -JA        PT Plan    PT Plan Comments D/C to HEP  -GALO               User Key  (r) = Recorded By, (t) = Taken By, (c) = Cosigned By      Initials Name Provider Type    Amy Skaggs, PT Physical Therapist                         OP Exercises       Row Name 07/27/23 1400             Subjective Comments    Subjective Comments I'm doing better. I was able to walk to the back of the parking lot and back again without feeling like I would fall.  -GALO         Subjective Pain    Able to rate subjective pain? yes  -GALO      Pre-Treatment Pain Level 0  -JA         Total Minutes    83500 - PT Therapeutic Exercise Minutes 30  -JA      57684 -  PT Neuromuscular Reeducation Minutes 10  -JA         Exercise 1    Exercise Name 1 Nustep  -JA      Cueing 1 Verbal  -JA      Time 1 5min  -JA      Additional Comments L5  -JA         Exercise 2    Exercise Name 2 Swiss ball roll out from sitting on EOB, W  -JA      Cueing 2 Verbal  -JA      Reps 2 25  -JA         Exercise 3    Exercise Name 3 LTR  -JA      Cueing 3 Verbal  -JA      Reps 3 20 L/R  -JA         Exercise 4    Exercise Name 4 QS w/SLR  -JA      Cueing 4 Verbal;Tactile  -JA      Sets 4 2 BL  -JA      Reps 4 10  -JA         Exercise 5    Exercise Name 5 --  -JA      Cueing 5 --  -JA      Reps 5 --  -JA      Time 5 --  -JA         Exercise 6    Exercise Name 6 Beginner Bridge  -JA      Cueing 6 Verbal;Tactile  -JA      Sets 6 2  -JA      Reps 6 10  -JA         Exercise 7    Exercise  "Name 7 Heel slides w/plastic on mat tabe  -JA      Reps 7 20ea  -JA         Exercise 8    Exercise Name 8 supine hip abd \"windshield wiper\"  -JA      Cueing 8 Verbal;Tactile  -JA      Reps 8 20  -JA         Exercise 9    Exercise Name 9 FW and BW walk  -JA      Reps 9 reviewed only  -JA         Exercise 10    Exercise Name 10 high march stepping  -JA      Cueing 10 Verbal  -JA      Reps 10 reviewed 0jp7  -JA         Exercise 11    Exercise Name 11 STS w/tband above knees  -JA      Cueing 11 Verbal  -JA      Reps 11 tested 5 FTSS instead  -JA         Exercise 12    Exercise Name 12 seated ham curls with resistance  -JA      Cueing 12 Verbal  -JA      Reps 12 15  -JA      Time 12 GTB  -JA         Exercise 14    Exercise Name 14 --  -JA      Cueing 14 --  -JA      Reps 14 --  -JA      Time 14 --  -JA         Exercise 15    Exercise Name 15 HL hip add  -JA      Cueing 15 Verbal;Demo  -JA      Reps 15 25  -JA      Time 15 smll ball  -JA         Exercise 16    Exercise Name 16 Seated hip abd  -JA      Cueing 16 Verbal;Demo  -JA      Reps 16 25  -JA      Time 16 RTB  -JA         Exercise 17    Exercise Name 17 shld ROW  -JA      Sets 17 2  -JA      Reps 17 10  -JA      Time 17 GTB  -JA         Exercise 18    Exercise Name 18 --  -JA      Reps 18 --  -JA      Time 18 --  -JA                User Key  (r) = Recorded By, (t) = Taken By, (c) = Cosigned By      Initials Name Provider Type    Amy Skaggs, PT Physical Therapist                                    PT OP Goals       Row Name 07/27/23 1400          PT Short Term Goals    STG Date to Achieve 07/21/23  -JA     STG 1 Pt will demonstrate understanding and compliance with initial HEP.  -JA     STG 1 Progress Met  -JA     STG 2 Pt. Will score 20/80  (from 11/80 on initial evaluation) to indicate improved perception of disability.  -GALO     STG 2 Progress Met  -JA        Long Term Goals    LTG Date to Achieve 09/01/23  -JA     LTG 1 Pt. Will be independent with " advanced HEP to improve long-term management of condition and independence.  -GALO     LTG 1 Progress Met  -GALO     LTG 2 Pt. Will score 30/80 (from 11/80 on initial evaluation) to indicate improved perception of disability.  -GALO     LTG 2 Progress Met  -GALO     LTG 3 Pt will demonstrate improved 5x STS performance from 24.3s to =/< 18s showing improved LE functional strength and a reduced risk of falling.  -GALO     LTG 3 Progress Not Met  -GALO     LTG 3 Progress Comments improved but didn't make the goal  -GALO     LTG 4 Pt will improve BL LE strength to grossly 4+/5 to improve ability to complete all ADLs and reduce falls risk.  -GALO     LTG 4 Progress Partially Met  -GALO     LTG 5 Pt will report a reduction in max back pain levels to </= 5/10 (from 10/10 at initial eval) improving quality of life and ease of participation in daily activities.  -GALO     LTG 5 Progress Met  -GALO               User Key  (r) = Recorded By, (t) = Taken By, (c) = Cosigned By      Initials Name Provider Type    Amy Skaggs, PT Physical Therapist                    Therapy Education  Education Details: reviewed HEP    Outcome Measure Options: 5x Sit to Stand  5 Times Sit to Stand  5 Times Sit to Stand (seconds): 23.5 seconds  5 Times Sit to Stand Comments: sivlia UE  Lower Extremity Functional Index  Any of your usual work, housework or school activities: Moderate difficulty  Your usual hobbies, recreational or sporting activities: Moderate difficulty  Getting into or out of the bath: Moderate difficulty  Walking between rooms: No difficulty  Putting on your shoes or socks: No difficulty  Squatting: Extreme difficulty or unable to perform activity  Lifting an object, like a bag of groceries from the floor: Moderate difficulty  Performing light activities around your home: A little bit of difficulty  Performing heavy activities around your home: Extreme difficulty or unable to perform activity  Getting into or out of a car: No  difficulty  Walking 2 blocks: Moderate difficulty  Walking a mile: Quite a bit of difficulty  Going up or down 10 stairs (about 1 flight of stairs): No difficulty  Standing for 1 hour: Moderate difficulty  Sitting for 1 hour: Moderate difficulty  Running on even ground: Extreme difficulty or unable to perform activity  Running on uneven ground: Extreme difficulty or unable to perform activity  Making sharp turns while running fast: Extreme difficulty or unable to perform activity  Hopping: Extreme difficulty or unable to perform activity  Rolling over in bed: Quite a bit of difficulty  Total: 35  Lower Extremity Functional Index  Any of your usual work, housework or school activities: Moderate difficulty  Your usual hobbies, recreational or sporting activities: Moderate difficulty  Getting into or out of the bath: Moderate difficulty  Walking between rooms: No difficulty  Putting on your shoes or socks: No difficulty  Squatting: Extreme difficulty or unable to perform activity  Lifting an object, like a bag of groceries from the floor: Moderate difficulty  Performing light activities around your home: A little bit of difficulty  Performing heavy activities around your home: Extreme difficulty or unable to perform activity  Getting into or out of a car: No difficulty  Walking 2 blocks: Moderate difficulty  Walking a mile: Quite a bit of difficulty  Going up or down 10 stairs (about 1 flight of stairs): No difficulty  Standing for 1 hour: Moderate difficulty  Sitting for 1 hour: Moderate difficulty  Running on even ground: Extreme difficulty or unable to perform activity  Running on uneven ground: Extreme difficulty or unable to perform activity  Making sharp turns while running fast: Extreme difficulty or unable to perform activity  Hopping: Extreme difficulty or unable to perform activity  Rolling over in bed: Quite a bit of difficulty  Total: 35      Time Calculation:   Start Time: 1415  Stop Time: 1500  Time  Calculation (min): 45 min  Timed Charges  22580 - PT Therapeutic Exercise Minutes: 30  73319 -  PT Neuromuscular Reeducation Minutes: 10  Total Minutes  Timed Charges Total Minutes: 40   Total Minutes: 40  Therapy Charges for Today       Code Description Service Date Service Provider Modifiers Qty    78196132211 HC PT NEUROMUSC RE EDUCATION EA 15 MIN 7/27/2023 Amy Woodall, PT GP 1    05206220742 HC PT THER PROC EA 15 MIN 7/27/2023 Amy Woodall, PT GP 2            PT G-Codes  Outcome Measure Options: 5x Sit to Stand  Total: 35     OP PT Discharge Summary  Date of Discharge: 07/27/23  Reason for Discharge: All goals achieved  Outcomes Achieved: Able to achieve all goals within established timeline  Discharge Destination: Home with home program      Amy Woodall, PT  7/27/2023

## 2023-08-23 ENCOUNTER — APPOINTMENT (OUTPATIENT)
Facility: HOSPITAL | Age: 53
End: 2023-08-23
Payer: MEDICARE

## 2023-08-23 PROCEDURE — 73110 X-RAY EXAM OF WRIST: CPT

## 2023-08-23 PROCEDURE — 73030 X-RAY EXAM OF SHOULDER: CPT

## 2023-08-23 PROCEDURE — 73070 X-RAY EXAM OF ELBOW: CPT

## 2023-08-30 ENCOUNTER — TRANSCRIBE ORDERS (OUTPATIENT)
Dept: PHYSICAL THERAPY | Facility: HOSPITAL | Age: 53
End: 2023-08-30
Payer: MEDICARE

## 2023-08-30 DIAGNOSIS — M25.539 PAIN IN WRIST, UNSPECIFIED LATERALITY: Primary | ICD-10-CM

## 2023-08-30 DIAGNOSIS — M79.603 PAIN OF UPPER EXTREMITY, UNSPECIFIED LATERALITY: ICD-10-CM

## 2023-09-18 ENCOUNTER — HOSPITAL ENCOUNTER (OUTPATIENT)
Dept: PHYSICAL THERAPY | Facility: HOSPITAL | Age: 53
Setting detail: THERAPIES SERIES
Discharge: HOME | End: 2023-09-18
Payer: OTHER MISCELLANEOUS

## 2023-09-18 DIAGNOSIS — R29.6 FALLS FREQUENTLY: ICD-10-CM

## 2023-09-18 DIAGNOSIS — S60.212A CONTUSION OF LEFT WRIST, INITIAL ENCOUNTER: ICD-10-CM

## 2023-09-18 DIAGNOSIS — S66.812A STRAIN OF OTHER SPECIFIED MUSCLES, FASCIA AND TENDONS AT WRIST AND HAND LEVEL, LEFT HAND, INITIAL ENCOUNTER: ICD-10-CM

## 2023-09-18 DIAGNOSIS — S46.112A STRAIN OF MUSCLE, FASCIA AND TENDON OF LONG HEAD OF BICEPS, LEFT ARM, INITIAL ENCOUNTER: Primary | ICD-10-CM

## 2023-09-18 PROCEDURE — 97110 THERAPEUTIC EXERCISES: CPT

## 2023-09-18 PROCEDURE — 97161 PT EVAL LOW COMPLEX 20 MIN: CPT

## 2023-09-18 NOTE — THERAPY EVALUATION
Outpatient Physical Therapy Ortho Initial Evaluation  [facility]     Patient Name: Ceferino Alvarado  : 1970  MRN: [id number]  Today's Date: 2023      Visit Date: 2023    Patient Active Problem List   Diagnosis    Primary hypertension    Type 2 diabetes mellitus, without long-term current use of insulin    Obstructive sleep apnea syndrome    Other hyperlipidemia    Other pulmonary embolism without acute cor pulmonale    Non Hodgkin's lymphoma    Morbid obesity        Past Medical History:   Diagnosis Date    Allergic     Arthritis     Cancer     Non-Hodgkins Lymphoma    Cataract     Cholelithiasis     Clotting disorder     Deep vein thrombosis     Diabetes mellitus     Hyperlipidemia     Hypertension     Obesity     Pulmonary embolism     Urinary tract infection         Past Surgical History:   Procedure Laterality Date    CARDIAC CATHETERIZATION      CHOLECYSTECTOMY      COLONOSCOPY      D & C AND LAPAROSCOPY      18 years ago    TONSILLECTOMY      age 2       Visit Dx:     ICD-10-CM ICD-9-CM   1. Strain of muscle, fascia and tendon of long head of biceps, left arm, initial encounter  S46.112A 840.8   2. Falls frequently  R29.6 V15.88   3. Strain of other specified muscles, fascia and tendons at wrist and hand level, left hand, initial encounter  S66.812A [id number]   4. Contusion of left wrist, initial encounter  S60.212A 923.21          Patient History       Row Name 23 1200             History    Chief Complaint Balance Problems;Difficulty with daily activities;Falls/history of falls;Muscle tenderness;Muscle weakness;Pain;Swelling  -ER      Date Current Problem(s) Began 23  -ER      Brief Description of Current Complaint Kang Alvarado is a 52 year old female presenting to [facility] OP Physical Therapy for an initial evaluation following a fall at work. Per chart review, pt. has had several falls over the past couple years resulting in several injuries including L ankle fx. Today, she is  referred for strain of muscle, fascia, tendon of the left long head of the biceps, strain of muscle, fascia and tendon at the L wrist and hand level and contusion to the L wrist. Pt. reports she fell at work when she tripped over a case of villa. Pt. denies hitting her head however, she reports that EMS had to assist her in getting up. She notes that she went to the ED following the fall and findings of significant injury were unremarkable. She then returned home and reports an additional fall out of a chair at home. She went to urgent care the next Monday and they found strain to L UE. Pain is made worse with twisting door knobs, household chores, lifting, ADLs like washing hair. She notes that hot showers alleviate some pain. She is wearing a wrist brace throughout evaluation and reports she wears it all the time unless sleeping or bathing.  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 Standing all day, occasionally walking all day  -ER         Pain     Pain Location Elbow;Arm;Wrist  -ER      Pain at Present 7  -ER      Pain at Best 6  -ER      Pain at Worst 10  -ER      Pain Description Discomfort;Nagging;Sore  -ER      What Performance Factors Make the Current Problem(s) WORSE? reaching overhead, any type of lifting  -ER      What Performance Factors Make the Current Problem(s) BETTER? Hot shower, topical cream  -ER      Is your sleep disturbed? No  -ER      Difficulties at work? lifting, reaching, turning door knobs  -ER      Difficulties with ADL's? washing hair, pt. states she uses one hand (R UE)  -ER      Difficulties with recreational activities? No  -ER         Fall Risk Assessment    Any falls in the past year: Yes  -ER      Number of  falls reported in the last 12 months 1-2  -ER      Factors that contributed to the fall: Tripped;Lost balance;Other (comment)  -ER      Does patient have a fear of falling Yes (comment)  -ER      Previous Functional Level Ambulation  -ER         Services    Prior Rehab/Home Health Experiences No  -ER         Daily Activities    Primary Language English  -ER      How does patient learn best? Demonstration  -ER      Does patient have problems with the following? Anxiety  -ER      Barriers to learning None  -ER      Pt Participated in POC and Goals Yes  -ER                User Key  (r) = Recorded By, (t) = Taken By, (c) = Cosigned By      Initials Name Provider Type    ER Stella Deleon, PT Physical Therapist                     PT Ortho       Row Name 09/18/23 1200       Precautions and Contraindications    Precautions/Limitations fall precautions;other (see comments)  wrist brace to L UE  -ER       Posture/Observations    Alignment Options Forward head;Rounded shoulders  -ER    Rounded Shoulders Bilateral:;Moderate  -ER    Posture/Observations Comments bilateral rounded shoulders, L shoulder lower than R shoulder. Forward head.  -ER       Quarter Clearing    Quarter Clearing Upper Quarter Clearing  -ER       Sensory Screen for Light Touch- Upper Quarter Clearing    C4 (posterior shoulder) Intact  -ER    C5 (lateral upper arm) Intact  -ER    C6 (tip of thumb) Intact  -ER    C7 (tip of 3rd finger) Intact  -ER    C8 (tip of 5th finger) Intact  -ER    T1 (medial lower arm) Intact  -ER       Myotomal Screen- Upper Quarter Clearing    Shoulder flexion (C5) Left:;3+ (Fair +)  -ER    Elbow flexion/wrist extension (C6) Left:;3+ (Fair +)  -ER    Elbow extension/wrist flexion (C7) Left:;3+ (Fair +)  -ER    Finger flexion/ (C8) Left:;3+ (Fair +)  -ER    Finger abduction (T1) Left:;3+ (Fair +)  -ER     Left:;4 (Good)  -ER       Cervical/Shoulder ROM Screen    Cervical flexion Normal  -ER    Cervical extension Normal  -ER     Cervical lateral flexion Normal  -ER    Cervical rotation Normal  -ER    Cervical quadrant (Spurling's) Normal  -ER    Shoulder elevation  Normal  -ER       Special Tests/Palpation    Special Tests/Palpation Cervical/Thoracic;Shoulder;Elbow/Forearm  -ER       Cervical Palpation    Cervical Palpation- Location? AC joint  -ER    AC Joint --  Normal  -ER       Cervical Accessory Motions    Cervical Accessory Motions Tested? Yes  -ER       Thoracic Accessory Motions    Thoracic Accessory Motions Tested? Yes  -ER       Shoulder Impingement/Rotator Cuff Special Tests    Speed's Test (LH of Biceps Lesion) Positive  with pain  -ER    Yergason Test (LH of Biceps Lesion) Positive  with pain  -ER       Shoulder Girdle Palpation    Shoulder Girdle Palpation? Yes  -ER       Elbow/Forearm Palpation    Elbow/Forearm Palpation? No Tenderness/Abnormality  -ER       General ROM    LT Upper Ext Lt Shoulder Extension;Lt Shoulder Flexion;Lt Elbow Extension/Flexion  -ER    GENERAL ROM COMMENTS Pt. significantly limited by pain. Difficult to assess  -ER       Left Upper Ext    Lt Shoulder Extension AROM 25 from trunk in standing  tactile cueing to avoid UT involvement secondary to pain  -ER    Lt Shoulder Extension PROM 30 from trunk in standing  -ER    Lt Shoulder Flexion AROM 110  -ER    Lt Shoulder Flexion PROM 116  -ER    Lt Elbow Extension/Flexion AROM 7 from neutral extension  -ER    Lt Elbow Extension/Flexion PROM 5 from neutral extension  -ER    Lt Upper Extremity Comments  significant pain  -ER       MMT (Manual Muscle Testing)    Lt Upper Ext Lt Shoulder Flexion;Lt Shoulder Extension;Lt Shoulder ABduction;Lt Elbow Extension;Lt Elbow Flexion;Lt Forearm Supination;Lt Forearm Pronation;Lt Shoulder Internal Rotation;Lt Shoulder External Rotation  -ER    General MMT Comments limited secondary to pain, gentle MMT  -ER       MMT Left Upper Ext    Lt Shoulder Flexion MMT, Gross Movement (3+/5) fair plus  -ER    Lt Shoulder Extension  MMT, Gross Movement (3+/5) fair plus  -ER    Lt Shoulder ABduction MMT, Gross Movement (3+/5) fair plus  -ER    Lt Shoulder Internal Rotation MMT, Gross Movement (3+/5) fair plus  -ER    Lt Shoulder External Rotation MMT, Gross Movement (3+/5) fair plus  -ER    Lt Elbow Flexion MMT, Gross Movement (3+/5) fair plus  -ER    Lt Elbow Extension MMT, Gross Movement (3+/5) fair plus  -ER    Lt Forearm Supination MMT, Gross Movement (3-/5) fair minus  -ER    Lt Forearm Pronation MMT, Gross Movement (3/5) fair  -ER       Sensation    Sensation WNL? WFL  -ER    Light Touch No apparent deficits  -ER    Sharp/Dull No apparent deficits  -ER       Hand  Strength     Strength Affected Side Bilateral;Other (comment)  4/5 on L, 5/5 R  -ER              User Key  (r) = Recorded By, (t) = Taken By, (c) = Cosigned By      Initials Name Provider Type    ER Stella Deleon, PT Physical Therapist                                Therapy Education  Education Details: Encouraged pt. to continue to use ice/heat as needed for pain control, Educated on pain management and rest to tolerance, Pt. given Scap squeezes and gentle table slides for HEP, pt. verbalizes understanding.  Given: HEP, Symptoms/condition management, Pain management  Program: New  How Provided: Verbal, Demonstration  Provided to: Patient  Level of Understanding: Verbalized, Demonstrated      PT OP Goals       Row Name 09/18/23 1200          PT Short Term Goals    STG 1 Pt. will demonstrate ability to reach across body for log roll prior to sitting up from mat with minimal pain to facilitate ease with mobility  -ER     STG 1 Progress New  -ER     STG 2 Pt. will be able to independently perform HEP therex with minimal to no corrections to ensure appropriate performance of therex  -ER     STG 2 Progress New  -ER     STG 3 Pt. will be able to lift 5 lbs from mat to table to simulate work environment with pain reports of 2/10 or less.  -ER     STG 3 Progress New  -ER         Long Term Goals    LTG 1 Pt. will report 2/10 pain or less with through all UE ranges to maintain range and promote bilateral UE use  -ER     LTG 1 Progress New  -ER     LTG 2 Pt. will report 50/100 or less on QuickDash to indicate improve QoL and reduced disability.  -ER     LTG 2 Progress New  -ER     LTG 3 Pt. will be able to demo bilateral overhead movements with minimal complaints of pain to faciliate actions required to be independent with ADLs.  -ER     LTG 3 Progress New  -ER        Time Calculation    PT Goal Re-Cert Due Date 12/17/23  -ER               User Key  (r) = Recorded By, (t) = Taken By, (c) = Cosigned By      Initials Name Provider Type    ER Stella Deleon, PT Physical Therapist                     PT Assessment/Plan       Row Name 09/18/23 1300          PT Assessment    Functional Limitations Decreased safety during functional activities;Limitations in community activities;Limitation in home management;Limitations in functional capacity and performance;Performance in leisure activities;Performance in self-care ADL;Performance in work activities  -ER     Impairments Balance;Muscle strength;Pain;Poor body mechanics;Posture;Range of motion;Sensation  -ER     Assessment Comments Pt. is a 52 year old female, referred to [facility] OP Physical Therapy after falling at work resulting in L long head of biceps strain, and resulting L elbow and wrist pain. She presents with a stable clinical presentation. She has comorbidities including DM that may affect her progress in plan of care. Self reported disability score on QuickDash was 65%. Today, pt. with increased pain with ROM in all planes. Pt. reporting 7/10 pain consistently. Pt. has some difficulty transitioning from supine<>sit on mat table at evaluation. Initiated scapular retractions and gentle table slides at eval, however educated patient on importance of maintaining pain tolerance practices including ice/heat and rest into home regimen. Pt. w/ 2x falls in  same day as well. Signs and symptoms are consistent with referring diagnosis. Pt. will benefit from skilled physical therapy to maintain range, reduce pain, and return to PLOF so she can return to work.  -ER     Please refer to paper survey for additional self-reported information Yes  -ER     Rehab Potential Good  -ER     Patient/caregiver participated in establishment of treatment plan and goals Yes  -ER     Patient would benefit from skilled therapy intervention Yes  -ER        PT Plan    PT Frequency 1x/week  -ER     Predicted Duration of Therapy Intervention (PT) 4-6 visits  -ER     Planned CPT's? PT EVAL LOW COMPLEXITY: 46969;PT THER PROC EA 15 MIN: 92719;PT THER ACT EA 15 MIN: 91581;PT MANUAL THERAPY EA 15 MIN: 46328;PT NEUROMUSC RE-EDUCATION EA 15 MIN: 79543;PT GAIT TRAINING EA 15 MIN: 54645;PT HOT/COLD PACK WC NONMCARE: 68439;PT ELECTRICAL STIM ATTD EA 15 MIN: 45299  -ER     PT Plan Comments Plan for x1-2 a week and assess tolerance to therex, some inconsistencies in pain location/aggrivating factors. Consider gentle shoulder range, modalities for pain management, functional tasks like unweighted reaching, assisted ROM within pain free range, banister slides?  -ER               User Key  (r) = Recorded By, (t) = Taken By, (c) = Cosigned By      Initials Name Provider Type    ER Stella Deleon, PT Physical Therapist                       OP Exercises       Row Name 09/18/23 1200             Subjective    Subjective Comments Pt. reports that her pain in the L UE is limiting her ability to return to work. She notes that any reaching, lifting or overhead movements are extremely painful. ADLs and hobbies are affected secondary to pain. She has most difficulty with opening doors, jars, washing hair, and lifting. She reports two falls within the same day x1 tripping over a case of water, x1 falling out of a chair. She notes she has been in PT previously, however for a different condition.  -ER         Subjective Pain     Able to rate subjective pain? yes  -ER      Pre-Treatment Pain Level 7  -ER      Post-Treatment Pain Level 7  -ER         Total Minutes    62387 - PT Therapeutic Exercise Minutes 15  -ER         Exercise 1    Exercise Name 1 PT EVAL  -ER         Exercise 2    Exercise Name 2 Scapular Retractions in sitting  -ER      Cueing 2 Verbal;Tactile  -ER      Sets 2 1  -ER      Reps 2 15  -ER      Additional Comments cues for relaxation of bilateral UT  -ER         Exercise 3    Exercise Name 3 Table slides  -ER      Cueing 3 Verbal;Demo;Tactile  -ER      Sets 3 1  -ER      Reps 3 15  -ER         Exercise 4    Exercise Name 4 supine cross chest reaching  -ER      Cueing 4 Verbal;Tactile  -ER      Sets 4 1  -ER      Reps 4 10 E  -ER      Additional Comments simulate position for rolling - pt. had difficulty with supine<>sit  -ER                User Key  (r) = Recorded By, (t) = Taken By, (c) = Cosigned By      Initials Name Provider Type    ER Stella Deleon, PT Physical Therapist                                  Outcome Measure Options: Quick DASH  Quick DASH  Open a tight or new jar.: Severe Difficulty  Do heavy household chores (e.g., wash walls, wash floors): Severe Difficulty  Carry a shopping bag or briefcase: Severe Difficulty  Wash your back: Severe Difficulty  Recreational activities in which you take some force or impact through your arm, should or hand (e.g. golf, hammering, tennis, etc.): Severe Difficulty  During the past week, to what extent has your arm, shoulder, or hand problem interfered with your normal social activites with family, friends, neighbors or groups?: Quite a bit  During the past week, were you limited in your work or other regular daily activities as a result of your arm, shoulder or hand problem?: Very limited  Arm, Shoulder, or hand pain: Severe  Tingling (pins and needles) in your arm, shoulder, or hand: None  During the past week, how much difficulty have you had sleeping because of the pain  in your arm, shoulder or hand?: Moderate Difficiculty  Number of Questions Answered: 10  Quick DASH Score: 65         Time Calculation:     Start Time: 0915  Stop Time: 0956  Time Calculation (min): 41 min  Timed Charges  54812 - PT Therapeutic Exercise Minutes: 15  Untimed Charges  PT Eval/Re-eval Minutes: 26  Total Minutes  Timed Charges Total Minutes: 15  Untimed Charges Total Minutes: 26   Total Minutes: 41     Therapy Charges for Today       Code Description Service Date Service Provider Modifiers Qty    88728004109 HC PT THER PROC EA 15 MIN 9/18/2023 Stella Deleon, PT GP 1    74930290280 HC PT EVAL LOW COMPLEXITY 2 9/18/2023 Stella Deleon, PT GP 1            PT G-Codes  Outcome Measure Options: Quick DASH  Quick DASH Score: 65         Stella Deleon, PT  9/18/2023

## 2023-09-20 ENCOUNTER — HOSPITAL ENCOUNTER (OUTPATIENT)
Dept: PHYSICAL THERAPY | Facility: HOSPITAL | Age: 53
Setting detail: THERAPIES SERIES
Discharge: HOME | End: 2023-09-20
Payer: MEDICARE

## 2023-09-20 DIAGNOSIS — S46.112A STRAIN OF MUSCLE, FASCIA AND TENDON OF LONG HEAD OF BICEPS, LEFT ARM, INITIAL ENCOUNTER: ICD-10-CM

## 2023-09-20 DIAGNOSIS — S66.812A STRAIN OF OTHER SPECIFIED MUSCLES, FASCIA AND TENDONS AT WRIST AND HAND LEVEL, LEFT HAND, INITIAL ENCOUNTER: ICD-10-CM

## 2023-09-20 DIAGNOSIS — S60.212A CONTUSION OF LEFT WRIST, INITIAL ENCOUNTER: ICD-10-CM

## 2023-09-20 DIAGNOSIS — R29.6 FALLS FREQUENTLY: Primary | ICD-10-CM

## 2023-09-20 PROCEDURE — 97110 THERAPEUTIC EXERCISES: CPT

## 2023-09-20 NOTE — THERAPY TREATMENT NOTE
Outpatient Physical Therapy Ortho Treatment Note  [facility]     Patient Name: Ceferino Alvarado  : 1970  MRN: [id number]  Today's Date: 2023      Visit Date: 2023    Visit Dx:    ICD-10-CM ICD-9-CM   1. Falls frequently  R29.6 V15.88   2. Strain of muscle, fascia and tendon of long head of biceps, left arm, initial encounter  S46.112A 840.8   3. Strain of other specified muscles, fascia and tendons at wrist and hand level, left hand, initial encounter  S66.812A [id number]   4. Contusion of left wrist, initial encounter  S60.212A 923.21       Patient Active Problem List   Diagnosis    Primary hypertension    Type 2 diabetes mellitus, without long-term current use of insulin    Obstructive sleep apnea syndrome    Other hyperlipidemia    Other pulmonary embolism without acute cor pulmonale    Non Hodgkin's lymphoma    Morbid obesity        Past Medical History:   Diagnosis Date    Allergic     Arthritis     Cancer     Non-Hodgkins Lymphoma    Cataract     Cholelithiasis     Clotting disorder     Deep vein thrombosis     Diabetes mellitus     Hyperlipidemia     Hypertension     Obesity     Pulmonary embolism     Urinary tract infection         Past Surgical History:   Procedure Laterality Date    CARDIAC CATHETERIZATION      CHOLECYSTECTOMY      COLONOSCOPY      D & C AND LAPAROSCOPY      18 years ago    TONSILLECTOMY      age 2                        PT Assessment/Plan       Row Name 23 1009          PT Assessment    Assessment Comments Pt returns for initial follow up after evaluation reporting no changes in symptoms. Performed several wrist ROM exercises as well as shoulder/elbow mobility and strengthening as tolerated. Pt able to perform row/ext with YTB, however held on 1# weight with bicep curl due to discomfort. Pt returns to MD tomorrow and may wean out of brace pending MD recommendation.  -CN        PT Plan    PT Plan Comments Assess response to added exercises and consider shoulder  flex/abd (supine?) next visit.  -CN               User Key  (r) = Recorded By, (t) = Taken By, (c) = Cosigned By      Initials Name Provider Type    Daisy Chambers, PT Physical Therapist                       OP Exercises       Row Name 09/20/23 0900             Subjective    Subjective Comments It is feeling ok today.  -CN         Total Minutes    06376 - PT Therapeutic Exercise Minutes 38  -CN         Exercise 1    Exercise Name 1 UBE warm up  -CN      Cueing 1 Verbal;Demo  -CN      Time 1 4 min  -CN         Exercise 2    Exercise Name 2 Scapular Retractions in sitting  -CN      Cueing 2 Verbal;Tactile  -CN      Sets 2 1  -CN      Reps 2 15  -CN      Additional Comments cues for relaxation of bilateral UT  -CN         Exercise 3    Exercise Name 3 Post shoulder rolls  -CN      Cueing 3 Verbal;Demo  -CN      Reps 3 15  -CN         Exercise 4    Exercise Name 4 Standing bicep curls  -CN      Cueing 4 Verbal;Demo  -CN      Reps 4 15  -CN      Additional Comments cues for TA, attempted with 1# weight held weight per increased pain  -CN         Exercise 5    Exercise Name 5 Wrist flex/ext  -CN      Cueing 5 Verbal;Demo  -CN      Reps 5 15  -CN         Exercise 6    Exercise Name 6 Wrist ulnar/radial deviation  -CN      Cueing 6 Verbal;Demo  -CN      Reps 6 15  -CN         Exercise 7    Exercise Name 7 Wrist circles  -CN      Cueing 7 Verbal;Demo  -CN      Reps 7 15 CW and CCW  -CN         Exercise 8    Exercise Name 8 Wrist pronation/supination  -CN      Cueing 8 Verbal;Demo  -CN      Reps 8 15  -CN         Exercise 9    Exercise Name 9 Seated UT stretch  -CN      Cueing 9 Verbal;Demo  -CN      Reps 9 3  -CN      Time 9 20 sec  -CN         Exercise 10    Exercise Name 10 Rows  -CN      Cueing 10 Verbal;Demo  -CN      Sets 10 2  -CN      Reps 10 10  -CN      Time 10 YTB  -CN         Exercise 11    Exercise Name 11 Shoulder ext  -CN      Cueing 11 Verbal;Demo  -CN      Sets 11 2  -CN      Reps 11 10  -CN       Time 11 YTB  -CN         Exercise 12    Exercise Name 12 Wall slides  -CN      Cueing 12 Verbal;Demo  -CN      Reps 12 10  -CN         Exercise 13    Exercise Name 13 Prayer stretch, wrist  -CN      Cueing 13 Verbal;Demo  -CN      Reps 13 3  -CN      Time 13 20 sec  -CN                User Key  (r) = Recorded By, (t) = Taken By, (c) = Cosigned By      Initials Name Provider Type    Daisy Chambers, PT Physical Therapist                                     Therapy Education  Given: HEP, Symptoms/condition management, Pain management  Program: Reinforced  How Provided: Verbal, Demonstration  Provided to: Patient  Level of Understanding: Verbalized, Demonstrated              Time Calculation:   Start Time: 0923  Stop Time: 1001  Time Calculation (min): 38 min  Timed Charges  91289 - PT Therapeutic Exercise Minutes: 38  Total Minutes  Timed Charges Total Minutes: 38   Total Minutes: 38  Therapy Charges for Today       Code Description Service Date Service Provider Modifiers Qty    [id number]  PT THER PROC EA 15 MIN 9/20/2023 Daisy Vargas, PT GP 3                      Daisy Vargas PT  9/20/2023

## 2023-09-25 ENCOUNTER — HOSPITAL ENCOUNTER (OUTPATIENT)
Dept: PHYSICAL THERAPY | Facility: HOSPITAL | Age: 53
Setting detail: THERAPIES SERIES
Discharge: HOME | End: 2023-09-25
Payer: MEDICARE

## 2023-09-25 DIAGNOSIS — S66.812A STRAIN OF OTHER SPECIFIED MUSCLES, FASCIA AND TENDONS AT WRIST AND HAND LEVEL, LEFT HAND, INITIAL ENCOUNTER: ICD-10-CM

## 2023-09-25 DIAGNOSIS — S60.212A CONTUSION OF LEFT WRIST, INITIAL ENCOUNTER: ICD-10-CM

## 2023-09-25 DIAGNOSIS — R29.6 FALLS FREQUENTLY: Primary | ICD-10-CM

## 2023-09-25 DIAGNOSIS — S46.112A STRAIN OF MUSCLE, FASCIA AND TENDON OF LONG HEAD OF BICEPS, LEFT ARM, INITIAL ENCOUNTER: ICD-10-CM

## 2023-09-25 PROCEDURE — 97110 THERAPEUTIC EXERCISES: CPT | Performed by: PHYSICAL THERAPIST

## 2023-09-25 NOTE — THERAPY TREATMENT NOTE
Outpatient Physical Therapy Ortho Treatment Note  [facility]     Patient Name: Ceferino Alvarado  : 1970  MRN: [id number]  Today's Date: 2023      Visit Date: 2023    Visit Dx:    ICD-10-CM ICD-9-CM   1. Falls frequently  R29.6 V15.88   2. Strain of muscle, fascia and tendon of long head of biceps, left arm, initial encounter  S46.112A 840.8   3. Strain of other specified muscles, fascia and tendons at wrist and hand level, left hand, initial encounter  S66.812A [id number]   4. Contusion of left wrist, initial encounter  S60.212A 923.21       Patient Active Problem List   Diagnosis    Primary hypertension    Type 2 diabetes mellitus, without long-term current use of insulin    Obstructive sleep apnea syndrome    Other hyperlipidemia    Other pulmonary embolism without acute cor pulmonale    Non Hodgkin's lymphoma    Morbid obesity        Past Medical History:   Diagnosis Date    Allergic     Arthritis     Cancer     Non-Hodgkins Lymphoma    Cataract     Cholelithiasis     Clotting disorder     Deep vein thrombosis     Diabetes mellitus     Hyperlipidemia     Hypertension     Obesity     Pulmonary embolism     Urinary tract infection         Past Surgical History:   Procedure Laterality Date    CARDIAC CATHETERIZATION      CHOLECYSTECTOMY      COLONOSCOPY      D & C AND LAPAROSCOPY      18 years ago    TONSILLECTOMY      age 2        PT Ortho       Row Name 23 0850       Left Upper Ext    Lt Elbow Extension/Flexion AROM 0-5-140  -JS    Lt Upper Extremity Comments  tightness endrange flexion >extension  -JS              User Key  (r) = Recorded By, (t) = Taken By, (c) = Cosigned By      Initials Name Provider Type    Shereen Oliva, PT Physical Therapist                                 PT Assessment/Plan       Row Name 23 0850          PT Assessment    Assessment Comments Patient presents to PT with no c/o pain, without L wrist brace, using Ace bandage L elbow. Demonstrates improvement in  L elbow AROM with endrange tightness, mild discomfort noted, no current wrist pain.  Demonstrates improving mobility with supine<->sit transfer, continues to require min A to complete supine->sit transfer though increased ability to reach with L UE. Benefits from ongoing skilled PT to progress toward goals.  -JS        PT Plan    PT Plan Comments Continue PT. Progress light resistanace to current exercises as tolerated.  Continue to cue pt for postural awareness, core stabilization during treatment.  -JS               User Key  (r) = Recorded By, (t) = Taken By, (c) = Cosigned By      Initials Name Provider Type    Shereen Oliva, PT Physical Therapist                       OP Exercises       Row Name 09/25/23 0840             Subjective    Subjective Comments Reports seen by MD who cleared pt to remove the brace as long as painfree. Pt reports ability to go without brace for the past 3 days without pain.  -JS         Subjective Pain    Able to rate subjective pain? yes  -JS      Pre-Treatment Pain Level 0  -JS      Subjective Pain Comment mild pain/tightness endrange elbow motion  -JS         Total Minutes    94890 - PT Therapeutic Exercise Minutes 40  -JS         Exercise 1    Exercise Name 1 UBE warm up  -JS      Cueing 1 Verbal  -JS      Time 1 4 min  -JS         Exercise 2    Exercise Name 2 Scapular Retractions in sitting  -JS      Cueing 2 Verbal;Tactile  -JS      Sets 2 1  -JS      Reps 2 15  -JS         Exercise 3    Exercise Name 3 Post shoulder rolls  -JS      Cueing 3 Verbal;Demo  -JS      Reps 3 15  -JS         Exercise 4    Exercise Name 4 Standing bicep curls  -JS      Cueing 4 Verbal;Demo  -JS      Sets 4 2  -JS      Reps 4 10  -JS      Additional Comments cues for core stabilization, use of TrA  -JS         Exercise 5    Exercise Name 5 Wrist flex/ext  -JS      Cueing 5 Verbal;Demo  -JS      Reps 5 15  -JS         Exercise 6    Exercise Name 6 Wrist ulnar/radial deviation  -JS      Cueing 6  Verbal;Demo  -JS      Reps 6 15  -JS         Exercise 7    Exercise Name 7 Wrist circles  -JS      Cueing 7 Verbal;Demo  -JS      Reps 7 15 CW and CCW  -JS         Exercise 8    Exercise Name 8 Wrist pronation/supination  -JS      Cueing 8 Verbal;Demo  -JS      Reps 8 15  -JS         Exercise 9    Exercise Name 9 Seated UT stretch  -JS      Cueing 9 Verbal;Demo  -JS      Reps 9 3  -JS      Time 9 20 sec  -JS      Additional Comments cues for upright posture for improved cervical alignment  -JS         Exercise 10    Exercise Name 10 Rows  -JS      Cueing 10 Verbal;Demo  -JS      Sets 10 2  -JS      Reps 10 10  -JS      Time 10 YTB  -JS      Additional Comments cues for core stabilization, use of TrA  -JS         Exercise 11    Exercise Name 11 Shoulder ext  -JS      Cueing 11 Verbal;Demo  -JS      Sets 11 2  -JS      Reps 11 10  -JS      Time 11 YTB  -JS      Additional Comments cues for core stabilization, use of TrA  -JS         Exercise 12    Exercise Name 12 Wall slides  -JS      Cueing 12 Verbal;Demo  -JS      Reps 12 10  -JS         Exercise 13    Exercise Name 13 Prayer stretch, wrist  -JS      Cueing 13 Verbal;Demo  -JS      Reps 13 3  -JS      Time 13 20 sec  -JS         Exercise 14    Exercise Name 14 Supine shoulder flexion AAROM with wand  -JS      Cueing 14 Verbal;Demo  -JS      Reps 14 10  -JS      Time 14 5 sec  -JS         Exercise 15    Exercise Name 15 Supine HA  -JS      Cueing 15 Verbal;Demo  -JS      Reps 15 10  -JS      Time 15 no resistance  -JS         Exercise 16    Exercise Name 16 Gentle PROM L elbow  -JS      Reps 16 5 reps flex & extension  -JS                User Key  (r) = Recorded By, (t) = Taken By, (c) = Cosigned By      Initials Name Provider Type    JS Shereen Crowder, PT Physical Therapist                                  PT OP Goals       Row Name 09/25/23 0850          PT Short Term Goals    STG 1 Pt. will demonstrate ability to reach across body for log roll prior to sitting up  from mat with minimal pain to facilitate ease with mobility  -     STG 1 Progress Ongoing;Progressing  -     STG 1 Progress Comments Improved ability to reach across, requires min A to  complete supine to sit transfer  -     STG 2 Pt. will be able to independently perform HEP therex with minimal to no corrections to ensure appropriate performance of therex  -     STG 2 Progress Ongoing;Progressing  -     STG 2 Progress Comments Intermittent cues  -     STG 3 Pt. will be able to lift 5 lbs from mat to table to simulate work environment with pain reports of 2/10 or less.  -     STG 3 Progress Ongoing  -        Long Term Goals    LTG 1 Pt. will report 2/10 pain or less with through all UE ranges to maintain range and promote bilateral UE use  -     LTG 1 Progress Ongoing  -     LTG 2 Pt. will report 50/100 or less on QuickDash to indicate improve QoL and reduced disability.  -     LTG 2 Progress Ongoing  -     LTG 3 Pt. will be able to demo bilateral overhead movements with minimal complaints of pain to faciliate actions required to be independent with ADLs.  -     LTG 3 Progress Ongoing  -               User Key  (r) = Recorded By, (t) = Taken By, (c) = Cosigned By      Initials Name Provider Type    Shereen Oliva, PT Physical Therapist                    Therapy Education  Education Details: Reviewed HEP, with cues for technique as well as cues for core stabilization during standing exercises. Added supine exercises.  Reviewed log rolling for sit<->supine transfer.              Time Calculation:   Start Time: 0850  Stop Time: 0930  Time Calculation (min): 40 min  Timed Charges  63724 - PT Therapeutic Exercise Minutes: 40  Total Minutes  Timed Charges Total Minutes: 40   Total Minutes: 40  Therapy Charges for Today       Code Description Service Date Service Provider Modifiers Qty    62541303484 HC PT THER PROC EA 15 MIN 9/25/2023 Shereen Crowder, PT GP 3                      Shereen Crowder  PT  9/25/2023

## 2023-10-02 ENCOUNTER — HOSPITAL ENCOUNTER (OUTPATIENT)
Dept: PHYSICAL THERAPY | Facility: HOSPITAL | Age: 53
Discharge: HOME | End: 2023-10-02
Payer: MEDICARE

## 2023-10-02 DIAGNOSIS — S66.812A STRAIN OF OTHER SPECIFIED MUSCLES, FASCIA AND TENDONS AT WRIST AND HAND LEVEL, LEFT HAND, INITIAL ENCOUNTER: Primary | ICD-10-CM

## 2023-10-02 DIAGNOSIS — S60.212A CONTUSION OF LEFT WRIST, INITIAL ENCOUNTER: ICD-10-CM

## 2023-10-02 DIAGNOSIS — S46.112A STRAIN OF MUSCLE, FASCIA AND TENDON OF LONG HEAD OF BICEPS, LEFT ARM, INITIAL ENCOUNTER: ICD-10-CM

## 2023-10-02 DIAGNOSIS — R29.6 FALLS FREQUENTLY: ICD-10-CM

## 2023-10-02 PROCEDURE — 97110 THERAPEUTIC EXERCISES: CPT

## 2023-10-02 NOTE — THERAPY TREATMENT NOTE
Outpatient Physical Therapy Ortho Treatment Note  [facility]     Patient Name: Ceferino Alvarado  : 1970  MRN: [id number]  Today's Date: 10/2/2023      Visit Date: 10/02/2023    Visit Dx:    ICD-10-CM ICD-9-CM   1. Strain of other specified muscles, fascia and tendons at wrist and hand level, left hand, initial encounter  S66.812A [id number]   2. Contusion of left wrist, initial encounter  S60.212A 923.21   3. Strain of muscle, fascia and tendon of long head of biceps, left arm, initial encounter  S46.112A 840.8   4. Falls frequently  R29.6 V15.88       Patient Active Problem List   Diagnosis    Primary hypertension    Type 2 diabetes mellitus, without long-term current use of insulin    Obstructive sleep apnea syndrome    Other hyperlipidemia    Other pulmonary embolism without acute cor pulmonale    Non Hodgkin's lymphoma    Morbid obesity        Past Medical History:   Diagnosis Date    Allergic     Arthritis     Cancer     Non-Hodgkins Lymphoma    Cataract     Cholelithiasis     Clotting disorder     Deep vein thrombosis     Diabetes mellitus     Hyperlipidemia     Hypertension     Obesity     Pulmonary embolism     Urinary tract infection         Past Surgical History:   Procedure Laterality Date    CARDIAC CATHETERIZATION      CHOLECYSTECTOMY      COLONOSCOPY      D & C AND LAPAROSCOPY      18 years ago    TONSILLECTOMY      age 2                        PT Assessment/Plan       Row Name 10/02/23 1500          PT Assessment    Assessment Comments Ceferino continues to report improved LUE symptoms. She was able to progress several exercises this date to add weight or resistance with good tolerance. She continues to be challenged with control of BL UEs in full extended positions, requiring frequent tactile and verbal cueing at elbows to limit flexion. Continue to progress to more functional positioning as appropriate to simulate job relaxed tasks. She continues to be a good candidate for skilled PT.  -MO         PT Plan    PT Plan Comments Supine vs standing SA punch, supine skull  for triceps  -MO               User Key  (r) = Recorded By, (t) = Taken By, (c) = Cosigned By      Initials Name Provider Type    Pia Yu, PT Physical Therapist                       OP Exercises       Row Name 10/02/23 1100             Subjective    Subjective Comments Feeling really good. Able to touch my head and straighten my arm out  -MO         Total Minutes    10379 - PT Therapeutic Exercise Minutes 43  -MO         Exercise 1    Exercise Name 1 UBE warm up  -MO      Time 1 4 min  -MO         Exercise 2    Exercise Name 2 low row with scap retraction  -MO      Cueing 2 Verbal  -MO      Sets 2 2  -MO      Reps 2 10  -MO      Additional Comments RTB  -MO         Exercise 3    Exercise Name 3 Post shoulder rolls  -MO      Cueing 3 Verbal  -MO      Reps 3 15  -MO         Exercise 4    Exercise Name 4 Standing bicep curls  -MO      Cueing 4 Verbal  -MO      Sets 4 2  -MO      Reps 4 15  -MO      Additional Comments 1#  -MO         Exercise 5    Exercise Name 5 Wrist flex/ext  -MO      Cueing 5 Verbal  -MO      Sets 5 2  -MO      Reps 5 10  -MO      Additional Comments 1#  -MO         Exercise 6    Exercise Name 6 Wrist ulnar/radial deviation  -MO      Cueing 6 Verbal  -MO      Sets 6 2  -MO      Reps 6 10  -MO      Additional Comments 1#  -MO         Exercise 7    Exercise Name 7 Wrist circles  -MO      Cueing 7 Verbal  -MO      Sets 7 2  -MO      Reps 7 10 CW, CCW  -MO      Additional Comments 1#  -MO         Exercise 8    Exercise Name 8 Wrist pronation/supination  -MO      Cueing 8 Verbal  -MO      Sets 8 2  -MO      Reps 8 10  -MO      Additional Comments 1#  -MO         Exercise 10    Exercise Name 10 Rows  -MO      Cueing 10 Verbal  -MO      Sets 10 2  -MO      Reps 10 10  -MO      Time 10 RTB  -MO         Exercise 11    Exercise Name 11 Shoulder ext  -MO      Cueing 11 Verbal  -MO      Sets 11 2  -MO      Reps 11 10   -MO      Time 11 YTB  -MO         Exercise 13    Exercise Name 13 Prayer stretch, wrist  -MO      Cueing 13 Verbal  -MO      Reps 13 3  -MO      Time 13 20 sec  -MO         Exercise 14    Exercise Name 14 Supine shoulder flexion AAROM with wand  -MO      Cueing 14 Verbal  -MO      Reps 14 10  -MO      Time 14 5s  -MO      Additional Comments 2# weight on  -MO         Exercise 15    Exercise Name 15 Supine HA  -MO      Cueing 15 Verbal  -MO      Sets 15 2  -MO      Reps 15 10  -MO      Additional Comments YTB- verbal and tactile cueing for positioning  -MO         Exercise 17    Exercise Name 17 Wrist ext stretch  -MO      Cueing 17 Verbal  -MO      Sets 17 5  -MO      Reps 17 15s  -MO         Exercise 18    Exercise Name 18 Supine chest press w/ wand  -MO      Cueing 18 Verbal;Demo  -MO      Sets 18 2  -MO      Reps 18 10  -MO      Additional Comments 2# weight on wand  -MO         Exercise 19    Exercise Name 19 Standing shoulder flex  -MO      Cueing 19 Verbal;Tactile  -MO      Sets 19 2 BL  -MO      Reps 19 10  -MO      Additional Comments 1st set with YTB, modified movement with only ~15 deg of flexion. Second set without resistance  -MO                User Key  (r) = Recorded By, (t) = Taken By, (c) = Cosigned By      Initials Name Provider Type    Pia Yu, PT Physical Therapist                                  PT OP Goals       Row Name 10/02/23 1500          PT Short Term Goals    STG 1 Pt. will demonstrate ability to reach across body for log roll prior to sitting up from mat with minimal pain to facilitate ease with mobility  -MO     STG 1 Progress Ongoing;Progressing  -MO     STG 2 Pt. will be able to independently perform HEP therex with minimal to no corrections to ensure appropriate performance of therex  -MO     STG 2 Progress Ongoing;Progressing  -MO     STG 3 Pt. will be able to lift 5 lbs from mat to table to simulate work environment with pain reports of 2/10 or less.  -MO     STG 3  Progress Ongoing  -MO        Long Term Goals    LTG 1 Pt. will report 2/10 pain or less with through all UE ranges to maintain range and promote bilateral UE use  -MO     LTG 1 Progress Ongoing  -MO     LTG 2 Pt. will report 50/100 or less on QuickDash to indicate improve QoL and reduced disability.  -MO     LTG 2 Progress Ongoing  -MO     LTG 3 Pt. will be able to demo bilateral overhead movements with minimal complaints of pain to faciliate actions required to be independent with ADLs.  -MO     LTG 3 Progress Ongoing  -MO               User Key  (r) = Recorded By, (t) = Taken By, (c) = Cosigned By      Initials Name Provider Type    Pia Yu, PT Physical Therapist                    Therapy Education  Education Details: Add small weight such as soup can or TV remote to wrist exercises  Given: HEP  Program: Progressed  How Provided: Verbal, Demonstration  Provided to: Patient  Level of Understanding: Verbalized, Demonstrated              Time Calculation:   Start Time: 1130  Stop Time: 1213  Time Calculation (min): 43 min  Timed Charges  84729 - PT Therapeutic Exercise Minutes: 43  Total Minutes  Timed Charges Total Minutes: 43   Total Minutes: 43  Therapy Charges for Today       Code Description Service Date Service Provider Modifiers Qty    70325935614 HC PT THER PROC EA 15 MIN 10/2/2023 Pia Farmer, PT GP 3                      Pia Farmer PT  10/2/2023